# Patient Record
Sex: FEMALE | Race: WHITE | NOT HISPANIC OR LATINO | Employment: FULL TIME | URBAN - NONMETROPOLITAN AREA
[De-identification: names, ages, dates, MRNs, and addresses within clinical notes are randomized per-mention and may not be internally consistent; named-entity substitution may affect disease eponyms.]

---

## 2020-06-27 ENCOUNTER — TELEPHONE (OUTPATIENT)
Dept: URGENT CARE | Facility: CLINIC | Age: 50
End: 2020-06-27

## 2020-06-27 NOTE — TELEPHONE ENCOUNTER
----- Message from TIAGO Shah sent at 6/27/2020  9:15 AM EDT -----  Please call patient.  Covid-19 was not detected on your nasopharyngeal swab.  That is good news.  You may end your self quarantine as long as the following are satisfied:  At least 72 hours have passed since recovery defined as resolution of fever without fever reducing medications (Tylenol and /or Tylenol) AND improvement in respiratory symptoms (e.g. cough, shortness of air,), SOA, diarrhea, sore throat, muscle aches , headache and chills  AND  at least 10 days have passed since symptoms have first appeared.   Please do a virtual or E-visit follow-up with your primary care within 1 week.  Remember social distancing of 6 feet and and careful washing of hands.  Must wear cloth or surgical mask for 14 days or until symptoms resolve -whichever is longer.

## 2020-12-23 ENCOUNTER — IMMUNIZATION (OUTPATIENT)
Dept: VACCINE CLINIC | Facility: HOSPITAL | Age: 50
End: 2020-12-23

## 2020-12-23 PROCEDURE — 91301 HC SARSCO02 VAC 100MCG/0.5ML IM: CPT | Performed by: INTERNAL MEDICINE

## 2020-12-23 PROCEDURE — 0011A: CPT | Performed by: INTERNAL MEDICINE

## 2021-01-20 ENCOUNTER — IMMUNIZATION (OUTPATIENT)
Dept: VACCINE CLINIC | Facility: HOSPITAL | Age: 51
End: 2021-01-20

## 2021-01-20 PROCEDURE — 0012A: CPT | Performed by: INTERNAL MEDICINE

## 2021-01-20 PROCEDURE — 91301 HC SARSCO02 VAC 100MCG/0.5ML IM: CPT | Performed by: INTERNAL MEDICINE

## 2021-05-28 ENCOUNTER — TELEPHONE (OUTPATIENT)
Dept: SURGERY | Facility: CLINIC | Age: 51
End: 2021-05-28

## 2021-05-28 ENCOUNTER — OFFICE VISIT (OUTPATIENT)
Dept: INTERNAL MEDICINE | Facility: CLINIC | Age: 51
End: 2021-05-28

## 2021-05-28 VITALS
BODY MASS INDEX: 26.54 KG/M2 | DIASTOLIC BLOOD PRESSURE: 73 MMHG | HEIGHT: 63 IN | SYSTOLIC BLOOD PRESSURE: 128 MMHG | HEART RATE: 81 BPM | OXYGEN SATURATION: 100 % | WEIGHT: 149.8 LBS | TEMPERATURE: 97.7 F

## 2021-05-28 DIAGNOSIS — Z13.228 SCREENING FOR ENDOCRINE, NUTRITIONAL, METABOLIC AND IMMUNITY DISORDER: ICD-10-CM

## 2021-05-28 DIAGNOSIS — Z13.21 SCREENING FOR ENDOCRINE, NUTRITIONAL, METABOLIC AND IMMUNITY DISORDER: ICD-10-CM

## 2021-05-28 DIAGNOSIS — Z13.0 SCREENING FOR ENDOCRINE, NUTRITIONAL, METABOLIC AND IMMUNITY DISORDER: ICD-10-CM

## 2021-05-28 DIAGNOSIS — Z13.29 SCREENING FOR ENDOCRINE, NUTRITIONAL, METABOLIC AND IMMUNITY DISORDER: ICD-10-CM

## 2021-05-28 DIAGNOSIS — Z00.00 PHYSICAL EXAM, ANNUAL: Primary | ICD-10-CM

## 2021-05-28 DIAGNOSIS — R23.2 HOT FLASHES: ICD-10-CM

## 2021-05-28 DIAGNOSIS — Z12.11 SCREEN FOR COLON CANCER: ICD-10-CM

## 2021-05-28 DIAGNOSIS — R00.2 HEART PALPITATIONS: ICD-10-CM

## 2021-05-28 DIAGNOSIS — Z12.31 ENCOUNTER FOR SCREENING MAMMOGRAM FOR MALIGNANT NEOPLASM OF BREAST: ICD-10-CM

## 2021-05-28 PROCEDURE — 99396 PREV VISIT EST AGE 40-64: CPT | Performed by: NURSE PRACTITIONER

## 2021-05-28 RX ORDER — LANOLIN ALCOHOL/MO/W.PET/CERES
1000 CREAM (GRAM) TOPICAL DAILY
COMMUNITY

## 2021-05-28 RX ORDER — MELATONIN
1000 DAILY
COMMUNITY

## 2021-05-28 RX ORDER — MULTIVIT-MIN/IRON/FOLIC ACID/K 18-600-40
500 CAPSULE ORAL DAILY
COMMUNITY

## 2021-05-29 LAB
25(OH)D3+25(OH)D2 SERPL-MCNC: 57.1 NG/ML (ref 30–100)
ALBUMIN SERPL-MCNC: 4.5 G/DL (ref 3.5–5.2)
ALBUMIN/GLOB SERPL: 2.1 G/DL
ALP SERPL-CCNC: 84 U/L (ref 39–117)
ALT SERPL-CCNC: 14 U/L (ref 1–33)
AST SERPL-CCNC: 16 U/L (ref 1–32)
BASOPHILS # BLD AUTO: 0.03 10*3/MM3 (ref 0–0.2)
BASOPHILS NFR BLD AUTO: 0.6 % (ref 0–1.5)
BILIRUB SERPL-MCNC: 0.4 MG/DL (ref 0–1.2)
BUN SERPL-MCNC: 11 MG/DL (ref 6–20)
BUN/CREAT SERPL: 13.6 (ref 7–25)
CALCIUM SERPL-MCNC: 9.6 MG/DL (ref 8.6–10.5)
CHLORIDE SERPL-SCNC: 107 MMOL/L (ref 98–107)
CHOLEST SERPL-MCNC: 217 MG/DL (ref 0–200)
CO2 SERPL-SCNC: 26.6 MMOL/L (ref 22–29)
CREAT SERPL-MCNC: 0.81 MG/DL (ref 0.57–1)
EOSINOPHIL # BLD AUTO: 0.17 10*3/MM3 (ref 0–0.4)
EOSINOPHIL NFR BLD AUTO: 3.5 % (ref 0.3–6.2)
ERYTHROCYTE [DISTWIDTH] IN BLOOD BY AUTOMATED COUNT: 13.2 % (ref 12.3–15.4)
ESTRADIOL SERPL-MCNC: 51.1 PG/ML
FSH SERPL-ACNC: 73.8 MIU/ML
GLOBULIN SER CALC-MCNC: 2.1 GM/DL
GLUCOSE SERPL-MCNC: 92 MG/DL (ref 65–99)
HBA1C MFR BLD: 5.1 % (ref 4.8–5.6)
HCT VFR BLD AUTO: 38.8 % (ref 34–46.6)
HDLC SERPL-MCNC: 72 MG/DL (ref 40–60)
HGB BLD-MCNC: 12.8 G/DL (ref 12–15.9)
IMM GRANULOCYTES # BLD AUTO: 0.01 10*3/MM3 (ref 0–0.05)
IMM GRANULOCYTES NFR BLD AUTO: 0.2 % (ref 0–0.5)
LDLC SERPL CALC-MCNC: 132 MG/DL (ref 0–100)
LH SERPL-ACNC: 51.4 MIU/ML
LYMPHOCYTES # BLD AUTO: 1.47 10*3/MM3 (ref 0.7–3.1)
LYMPHOCYTES NFR BLD AUTO: 30.4 % (ref 19.6–45.3)
MCH RBC QN AUTO: 29.8 PG (ref 26.6–33)
MCHC RBC AUTO-ENTMCNC: 33 G/DL (ref 31.5–35.7)
MCV RBC AUTO: 90.2 FL (ref 79–97)
MONOCYTES # BLD AUTO: 0.42 10*3/MM3 (ref 0.1–0.9)
MONOCYTES NFR BLD AUTO: 8.7 % (ref 5–12)
NEUTROPHILS # BLD AUTO: 2.73 10*3/MM3 (ref 1.7–7)
NEUTROPHILS NFR BLD AUTO: 56.6 % (ref 42.7–76)
NRBC BLD AUTO-RTO: 0 /100 WBC (ref 0–0.2)
PLATELET # BLD AUTO: 236 10*3/MM3 (ref 140–450)
POTASSIUM SERPL-SCNC: 4.8 MMOL/L (ref 3.5–5.2)
PROLACTIN SERPL-MCNC: 10.1 NG/ML (ref 4.8–23.3)
PROT SERPL-MCNC: 6.6 G/DL (ref 6–8.5)
RBC # BLD AUTO: 4.3 10*6/MM3 (ref 3.77–5.28)
SODIUM SERPL-SCNC: 144 MMOL/L (ref 136–145)
T4 FREE SERPL-MCNC: 1 NG/DL (ref 0.93–1.7)
TRIGL SERPL-MCNC: 76 MG/DL (ref 0–150)
TSH SERPL DL<=0.005 MIU/L-ACNC: 4.1 UIU/ML (ref 0.27–4.2)
VIT B12 SERPL-MCNC: 1481 PG/ML (ref 211–946)
VLDLC SERPL CALC-MCNC: 13 MG/DL (ref 5–40)
WBC # BLD AUTO: 4.83 10*3/MM3 (ref 3.4–10.8)

## 2021-06-02 NOTE — TELEPHONE ENCOUNTER
PRESCREENING FOR OPEN ACCESS SCHEDULING    Shellie Palacio, 1970  [id number]    06/02/21    If, the patient answers yes to any of the following questions the provider will be informed prior to scheduling open access for approval and documented in the chart.    []  Yes  [x] No    1. Have you ever had a colonoscopy in the past?      When:        Where:       Polyps or other:     []  Yes  [x] No    2. Family history of colon cancer?      Relation:       Age of onset:       Do you currently have any of the following?    []  Yes  [x] No  Rectal bleeding, if so, how long?     []  Yes  [x] No  Abdominal pain, if so, how long?    []  Yes  [x] No  Constipation, if so, how long?    []  Yes  [x] No  Diarrhea, if so, how long?    []  Yes  [x] No  Weight loss, is so, how much?    [] Yes  [x] No  Small caliber stool, if so, how long?      Have you ever had any of the following conditions?    [] Yes  [x] No  Heart attack?      When?       Last cardiac workup?     Blood thinners?    [] Yes  [x] No   Lung problems, asthma or COPD?  [] Yes  [x] No  Oxygen required?       [] Yes  [x] No  Stroke?     [] Yes  [x] No  Have you ever had a reaction to anesthesia?      Patient is scheduled for a colonoscopy on 09/03/21

## 2021-08-10 ENCOUNTER — HOSPITAL ENCOUNTER (OUTPATIENT)
Dept: MAMMOGRAPHY | Facility: HOSPITAL | Age: 51
Discharge: HOME | End: 2021-08-10
Admitting: NURSE PRACTITIONER

## 2021-08-10 PROCEDURE — 77063 BREAST TOMOSYNTHESIS BI: CPT

## 2021-08-10 PROCEDURE — 77067 SCR MAMMO BI INCL CAD: CPT

## 2021-08-24 ENCOUNTER — TELEPHONE (OUTPATIENT)
Dept: SURGERY | Facility: CLINIC | Age: 51
End: 2021-08-24

## 2021-09-15 ENCOUNTER — OFFICE VISIT (OUTPATIENT)
Dept: CARDIOLOGY | Facility: CLINIC | Age: 51
End: 2021-09-15

## 2021-09-15 VITALS
BODY MASS INDEX: 28.24 KG/M2 | HEIGHT: 63 IN | DIASTOLIC BLOOD PRESSURE: 70 MMHG | OXYGEN SATURATION: 96 % | SYSTOLIC BLOOD PRESSURE: 116 MMHG | HEART RATE: 68 BPM | WEIGHT: 159.4 LBS

## 2021-09-15 DIAGNOSIS — R00.2 PALPITATIONS: Primary | ICD-10-CM

## 2021-09-15 DIAGNOSIS — R55 VASODEPRESSOR SYNCOPE: ICD-10-CM

## 2021-09-15 DIAGNOSIS — E78.2 MIXED HYPERLIPIDEMIA: ICD-10-CM

## 2021-09-15 PROCEDURE — 93000 ELECTROCARDIOGRAM COMPLETE: CPT | Performed by: INTERNAL MEDICINE

## 2021-09-15 PROCEDURE — 99244 OFF/OP CNSLTJ NEW/EST MOD 40: CPT | Performed by: INTERNAL MEDICINE

## 2021-09-15 NOTE — PROGRESS NOTES
[facility] Cardiology  Consultation H&P  Shellie Palacio  1970  [address]  [facility] [id number]     VISIT DATE:  09/15/21    PCP: Leatha Richards APRN  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 Palpitations  2. Hyperlipidemia  1. 7/16 197/72/47/128  2. 5/21 217/76/72/132  3. History of DVT  4. Vasodepressive syncope  5. G3, P3  6. Surgical  1. Tonsillectomy    CC:  Chief Complaint   Patient presents with   • Palpitations       Allergies  No Known Allergies    Current Medications    Current Outpatient Medications:   •  Ascorbic Acid (Vitamin C) 500 MG capsule, Take 500 mg by mouth Daily., Disp: , Rfl:   •  cholecalciferol (VITAMIN D3) 25 MCG (1000 UT) tablet, Take 1,000 Units by mouth Daily., Disp: , Rfl:   •  Misc Natural Products (OSTEO BI-FLEX JOINT SHIELD PO), Take 1 tablet by mouth Daily., Disp: , Rfl:   •  Multiple Vitamins-Minerals (MULTIVITAMIN WITH MINERALS) tablet tablet, Take 1 tablet by mouth Daily., Disp: , Rfl:   •  Probiotic Product (PROBIOTIC-10 PO), Take 1 tablet by mouth As Needed., Disp: , Rfl:   •  tretinoin (RETIN-A) 0.025 % cream, Apply thinly to face at night as tolerated. Wash off in the morning, Then moisturize, Disp: 20 g, Rfl: 2  •  triamcinolone (KENALOG) 0.1 % cream, Apply thinly to affected areas on trunk and extremities twice daily for up to 14 days per month do not use on face, groin, or axilla, Disp: 454 g, Rfl: 0  •  vitamin B-12 (CYANOCOBALAMIN) 1000 MCG tablet, Take 1,000 mcg by mouth Daily., Disp: , Rfl:   •  Zinc 50 MG capsule, Take 1 capsule by mouth Daily., Disp: , Rfl:      History of Present Illness   HPI  Shellie Palacio is a 50 y.o. year old female with the above mentioned PMH who presents for consult from TIAGO Aguirre for evaluation of palpitations dyslipidemia and syncope  Patient noted while lying on her left side on a few occasions she  would have tachypalpitations.  This was always positional and not associated with any activity.  She noted no alleviating or aggravating factors otherwise other than position  She also notes historical dyslipidemia her mother was hyperlipidemic as does her sister.  She noted one episode of vasodepressive syncope while attending the patient in her room she had a hot diaphoretic spell and then grabiel syncope.  She was on a diet plan at that time had given blood temporally related to the episode as well    Pt denies any chest pain, dyspnea at rest, dyspnea on exertion, orthopnea, PND,  lower extremity edema, or claudication. Pt denies history of CHF, DVT, PE, MI, CVA, TIA, or rheumatic fever.       ROS  Review of Systems   Constitutional: Negative for chills, fever, malaise/fatigue, night sweats, weight gain and weight loss.   HENT: Negative for hearing loss and nosebleeds.    Eyes: Negative for blurred vision, vision loss in left eye, vision loss in right eye, visual disturbance and visual halos.   Cardiovascular: Positive for palpitations. Negative for chest pain, claudication, cyanosis, dyspnea on exertion, irregular heartbeat, leg swelling, near-syncope, orthopnea, paroxysmal nocturnal dyspnea and syncope.   Respiratory: Negative for cough, hemoptysis, shortness of breath, snoring and wheezing.    Endocrine: Negative for cold intolerance, heat intolerance, polydipsia, polyphagia and polyuria.   Hematologic/Lymphatic: Negative for adenopathy and bleeding problem. Does not bruise/bleed easily.   Skin: Negative for dry skin, poor wound healing and rash.   Musculoskeletal: Negative for falls, joint pain, joint swelling, muscle cramps, muscle weakness, myalgias and neck pain.   Gastrointestinal: Negative for bloating, abdominal pain, change in bowel habit, bowel incontinence, constipation, diarrhea, dysphagia, excessive appetite, heartburn, hematemesis, hematochezia, jaundice, melena, nausea and vomiting.  "  Genitourinary: Negative for bladder incontinence, dysuria, flank pain, hematuria, hesitancy and nocturia.   Neurological: Negative for aphonia, excessive daytime sleepiness, dizziness, focal weakness, headaches, light-headedness, loss of balance, seizures, sensory change, tremors, vertigo and weakness.   Psychiatric/Behavioral: Negative for altered mental status, depression, memory loss, substance abuse and suicidal ideas. The patient is not nervous/anxious.    All other systems reviewed and are negative.      SOCIAL HX  Social History     Socioeconomic History   • Marital status:      Spouse name: Not on file   • Number of children: Not on file   • Years of education: Not on file   • Highest education level: Not on file   Tobacco Use   • Smoking status: Never Smoker   • Smokeless tobacco: Never Used   Substance and Sexual Activity   • Alcohol use: Yes     Alcohol/week: 1.0 standard drinks     Types: 1 Glasses of wine per week     Comment: occas   • Drug use: No   • Sexual activity: Defer       FAMILY HX  Family History   Problem Relation Age of Onset   • Hyperlipidemia Mother    • Dementia Mother    • Breast cancer Mother    • Cancer Maternal Grandmother    • Hearing loss Maternal Grandmother    • Heart disease Maternal Grandfather    • Asthma Father    • COPD Father    • No Known Problems Sister        Vitals:    09/15/21 1452   BP: 116/70   BP Location: Right arm   Patient Position: Sitting   Pulse: 68   SpO2: 96%   Weight: 72.3 kg (159 lb 6.4 oz)   Height: 160 cm (63\")     Body mass index is 28.24 kg/m².     PHYSICAL EXAMINATION:  Constitutional:       Appearance: Healthy appearance. Not in distress.   Neck:      Vascular: No JVR. JVD normal.   Pulmonary:      Effort: Pulmonary effort is normal.      Breath sounds: Normal breath sounds. No wheezing. No rhonchi. No rales.   Chest:      Chest wall: Not tender to palpatation.   Cardiovascular:      PMI at left midclavicular line. Normal rate. Regular " rhythm. Normal S1. Normal S2.      Murmurs: There is no murmur.      No gallop. No click. No rub.   Pulses:     Intact distal pulses.   Edema:     Peripheral edema absent.   Abdominal:      General: Bowel sounds are normal.      Palpations: Abdomen is soft.      Tenderness: There is no abdominal tenderness.   Musculoskeletal: Normal range of motion.         General: No tenderness. Skin:     General: Skin is warm and dry.   Neurological:      General: No focal deficit present.      Mental Status: Alert and oriented to person, place and time.         Diagnostic Data:    ECG 12 Lead    Date/Time: 9/15/2021 3:27 PM  Performed by: Rito Petty MD  Authorized by: Rito Petty MD   Previous ECG: no previous ECG available  Rhythm: sinus rhythm  BPM: 65    Clinical impression: non-specific ECG             Lab Results   Component Value Date    CHLPL 217 (H) 05/28/2021    TRIG 76 05/28/2021    HDL 72 (H) 05/28/2021     Lab Results   Component Value Date    GLUCOSE 92 07/29/2016    BUN 11 05/28/2021    CREATININE 0.81 05/28/2021     05/28/2021    K 4.8 05/28/2021     05/28/2021    CO2 26.6 05/28/2021     Lab Results   Component Value Date    HGBA1C 5.10 05/28/2021     Lab Results   Component Value Date    WBC 4.83 05/28/2021    HGB 12.8 05/28/2021    HCT 38.8 05/28/2021     05/28/2021       ASSESSMENT:   Diagnosis Plan   1. Palpitations     2. Mixed hyperlipidemia     3. Vasodepressor syncope         PLAN:  Palpitations normal EKG exam 1 week E patch    Dyslipidemia cardiac calcium scoring    Vasodepressive syncope discussed pathophysiology with her in the office today        TIAGO Aguirre, thank you for referring Ms. Palacio for evaluation.  I have forwarded my electronically generated recommendations to you for review.  Please do not hesitate to call with any questions.      Rito Petty MD, [facility]

## 2021-09-30 ENCOUNTER — PREP FOR SURGERY (OUTPATIENT)
Dept: OTHER | Facility: HOSPITAL | Age: 51
End: 2021-09-30

## 2021-09-30 DIAGNOSIS — Z12.11 COLON CANCER SCREENING: Primary | ICD-10-CM

## 2021-10-01 RX ORDER — POLYETHYLENE GLYCOL 3350 17 G/17G
POWDER, FOR SOLUTION ORAL
Qty: 238 G | Refills: 0 | Status: SHIPPED | OUTPATIENT
Start: 2021-10-01 | End: 2022-03-31

## 2021-10-01 RX ORDER — BISACODYL 5 MG
TABLET, DELAYED RELEASE (ENTERIC COATED) ORAL
Qty: 4 TABLET | Refills: 0 | Status: SHIPPED | OUTPATIENT
Start: 2021-10-01 | End: 2022-03-31

## 2021-10-02 ENCOUNTER — HOSPITAL ENCOUNTER (OUTPATIENT)
Dept: CT IMAGING | Facility: HOSPITAL | Age: 51
Discharge: HOME | End: 2021-10-02
Admitting: INTERNAL MEDICINE

## 2021-10-02 DIAGNOSIS — E78.2 MIXED HYPERLIPIDEMIA: ICD-10-CM

## 2021-10-02 PROCEDURE — 75571 CT HRT W/O DYE W/CA TEST: CPT

## 2021-10-04 ENCOUNTER — TELEPHONE (OUTPATIENT)
Dept: CARDIOLOGY | Facility: CLINIC | Age: 51
End: 2021-10-04

## 2021-10-04 DIAGNOSIS — I47.29 VENTRICULAR TACHYCARDIA (PAROXYSMAL) (HCC): ICD-10-CM

## 2021-10-04 DIAGNOSIS — R42 DIZZINESS: ICD-10-CM

## 2021-10-04 DIAGNOSIS — R55 VASODEPRESSOR SYNCOPE: ICD-10-CM

## 2021-10-04 DIAGNOSIS — R00.2 PALPITATIONS: Primary | ICD-10-CM

## 2021-10-04 DIAGNOSIS — R94.31 ABNORMAL HOLTER MONITOR FINDING: ICD-10-CM

## 2021-10-04 NOTE — TELEPHONE ENCOUNTER
Spoke with patient and advised that most recent CT calcium score was zero and within normal limits. RN also informed pt that a arrhythmia was detected on holter exam per  she needs a echo. Pt verbalized understanding

## 2021-10-06 ENCOUNTER — TELEPHONE (OUTPATIENT)
Dept: CARDIOLOGY | Facility: CLINIC | Age: 51
End: 2021-10-06

## 2021-10-06 NOTE — TELEPHONE ENCOUNTER
Pt is scheduled for colonoscopy on 10/08/21 and her echo is scheduled on 10/11/21. Please advise if patient is safe to proceed with GI procedure.

## 2021-10-08 ENCOUNTER — OUTSIDE FACILITY SERVICE (OUTPATIENT)
Dept: SURGERY | Facility: CLINIC | Age: 51
End: 2021-10-08

## 2021-10-08 PROCEDURE — 45385 COLONOSCOPY W/LESION REMOVAL: CPT | Performed by: SURGERY

## 2021-10-11 ENCOUNTER — APPOINTMENT (OUTPATIENT)
Dept: CARDIOLOGY | Facility: HOSPITAL | Age: 51
End: 2021-10-11

## 2021-10-20 ENCOUNTER — APPOINTMENT (OUTPATIENT)
Dept: CARDIOLOGY | Facility: HOSPITAL | Age: 51
End: 2021-10-20

## 2021-12-03 ENCOUNTER — IMMUNIZATION (OUTPATIENT)
Dept: VACCINE CLINIC | Facility: HOSPITAL | Age: 51
End: 2021-12-03

## 2021-12-03 DIAGNOSIS — Z23 NEED FOR VACCINATION: Primary | ICD-10-CM

## 2021-12-03 PROCEDURE — 0064A HC ADM SARSCOV2 50MCG/0.25ML BOOSTER: CPT | Performed by: INTERNAL MEDICINE

## 2021-12-03 PROCEDURE — 91306 HC SARSCOV2 VAC 50MCG/0.25ML IM: CPT | Performed by: INTERNAL MEDICINE

## 2022-01-17 ENCOUNTER — TRANSCRIBE ORDERS (OUTPATIENT)
Dept: PHYSICAL THERAPY | Facility: CLINIC | Age: 52
End: 2022-01-17

## 2022-01-17 DIAGNOSIS — S39.012A STRAIN OF LUMBAR REGION, INITIAL ENCOUNTER: Primary | ICD-10-CM

## 2022-01-25 ENCOUNTER — TREATMENT (OUTPATIENT)
Dept: PHYSICAL THERAPY | Facility: CLINIC | Age: 52
End: 2022-01-25

## 2022-01-25 DIAGNOSIS — S39.012S STRAIN OF LUMBAR REGION, SEQUELA: Primary | ICD-10-CM

## 2022-01-25 PROCEDURE — 97140 MANUAL THERAPY 1/> REGIONS: CPT | Performed by: PHYSICAL THERAPIST

## 2022-01-25 PROCEDURE — 97161 PT EVAL LOW COMPLEX 20 MIN: CPT | Performed by: PHYSICAL THERAPIST

## 2022-01-25 PROCEDURE — 97110 THERAPEUTIC EXERCISES: CPT | Performed by: PHYSICAL THERAPIST

## 2022-01-25 PROCEDURE — 97530 THERAPEUTIC ACTIVITIES: CPT | Performed by: PHYSICAL THERAPIST

## 2022-01-25 NOTE — PROGRESS NOTES
Physical Therapy Initial Evaluation and Plan of Care      Patient: Shellie Palacio   : 1970  Diagnosis/ICD-10 Code:  Strain of lumbar region, sequela [S39.012S]  Referring practitioner: Michelle Laura*    Subjective Evaluation    History of Present Illness  Mechanism of injury: Patient reports that she hurt her back on Dec 17th while at work. She states that she was helping a patient to the EOB and felt a burning in her low back. She states that she did not report anything because the pain went away pretty quickly. She states that a couple weeks later the pain returned in the back and her L leg was giving out. She states that she went to the doctor on  to be checked out. She states she was given two injections (pain and steroid). She states that she has not had any issues with the leg since then and states that she only has mild burning in the low back at times now. She states that she is working on light duty with no lifting/pushing/pulling over 10lbs.           Patient Occupation: OT at  [employer]  Current pain ratin  At best pain ratin  At worst pain rating: 3  Quality: burning  Relieving factors: heat, rest, relaxation and change in position  Aggravating factors: lifting, repetitive movement, stairs, movement and prolonged positioning  Progression: improved    Social Support  Lives with: spouse    Patient Goals  Patient goals for therapy: decreased pain, increased motion, increased strength, independence with ADLs/IADLs and return to work             Objective        Special Questions      Additional Special Questions  No red flags noted.       Postural Observations  Seated posture: fair  Standing posture: good        Palpation   Left   Tenderness of the erector spinae and lumbar paraspinals.     Right Tenderness of the erector spinae and lumbar paraspinals.     Neurological Testing     Sensation     Lumbar   Left   Intact: light touch    Right   Intact: light touch    Reflexes    Left   Patellar (L4): normal (2+)  Achilles (S1): normal (2+)    Right   Patellar (L4): normal (2+)  Achilles (S1): normal (2+)    Active Range of Motion     Lumbar   Normal active range of motion    Additional Active Range of Motion Details  Lumbar flexion: fingertips to medial malleolus   Lumbar extension: ~15 deg    Strength/Myotome Testing     Left Hip   Planes of Motion   Flexion: 4  Extension: 4-  Abduction: 4-  Adduction: 4+    Right Hip   Planes of Motion   Flexion: 4  Extension: 4-  Abduction: 4-  Adduction: 4+    Left Knee   Flexion: 4+  Extension: 4+    Right Knee   Flexion: 4+  Extension: 4+    Left Ankle/Foot   Dorsiflexion: 5  Plantar flexion: 5    Right Ankle/Foot   Dorsiflexion: 5  Plantar flexion: 5    Additional Strength Details  Patient demonstrates weakness in core.    Tests       Thoracic   Negative slump.     Lumbar     Left   Negative crossed SLR and passive SLR.     Right   Negative crossed SLR and passive SLR.      General Comments     Lumbar Comments  Patient ambulating well with no antalgic gait pattern noted.     She is able to manage positional changes well with no grimacing noted.     Patient was given HEP to assist return to prior functional status. All patient questions answered prior to completion of session.     No neuro symptoms noted during exam.     Tightness noted in B hamstrings with L being more significant.          Assessment & Plan     Assessment  Impairments: abnormal muscle tone, abnormal or restricted ROM, activity intolerance, impaired physical strength, lacks appropriate home exercise program and pain with function  Functional Limitations: carrying objects, lifting, pulling, pushing, uncomfortable because of pain, sitting and unable to perform repetitive tasks  Assessment details: Patient is a 51 year old female who comes to physical therapy with low back pain following a lifting injury at work. Signs and symptoms are consistent with strain of low back. The patient  currently has pain, decreased strength, and inability to perform many essential functional activities. No neuro signs/symptoms noted during exam. Patient was given HEP to assist with above listed deficits. Pt with no irritation noted at completion of session. Pt will benefit from skilled PT services to address the above issues.     Prognosis: good    Goals  Plan Goals: SHORT TERM GOALS:     2 weeks  1. Pt independent with HEP  2. Pt to report no increased pain in low back with 30 minutes of continuous activity in clinic.   3. Pt to demonstrate bilateral hip strength 4/5 in all planes to improved stability of the core/trunk     LONG TERM GOALS:   6 weeks  1. Pt to demonstrate ability to perform 30# box lift to assist with patient return to full duties at work.   2. Pt to demonstrate ability to perform full functional squat with good form and without increased pain in the low back   3. Pt to report being able to work full shift or work in the home without increase in pain in the back          Plan  Therapy options: will be seen for skilled therapy services  Planned modality interventions: cryotherapy, thermotherapy (hydrocollator packs), ultrasound and electrical stimulation/Russian stimulation  Planned therapy interventions: abdominal trunk stabilization, balance/weight-bearing training, body mechanics training, flexibility, functional ROM exercises, home exercise program, joint mobilization, therapeutic activities, stretching, strengthening, soft tissue mobilization, manual therapy and postural training  Frequency: 2x week  Duration in weeks: 6  Treatment plan discussed with: patient        Manual Therapy:    10     mins  48097;  Therapeutic Exercise:    12     mins  83546;     Neuromuscular Daria:        mins  30204;    Therapeutic Activity:     13     mins  25046;     Gait Training:           mins  63033;     Ultrasound:          mins  17191;    Electrical Stimulation:         mins  15900 ( );  Dry  Needling          mins self-pay    Timed Treatment:   35   mins   Total Treatment:     58   mins    PT SIGNATURE: Michelle Rankin, YADIRA   [place] License: [id number]  DATE TREATMENT INITIATED: 1/25/2022    Initial Certification  Certification Period: 4/24/2022  I certify that the therapy services are furnished while this patient is under my care.  The services outlined above are required by this patient, and will be reviewed every 90 days.     PHYSICIAN: Michelle Laura, APRN      DATE:     Please sign and return via fax to [phone].. Thank you, [facility] Therapy.

## 2022-01-27 ENCOUNTER — TREATMENT (OUTPATIENT)
Dept: PHYSICAL THERAPY | Facility: CLINIC | Age: 52
End: 2022-01-27

## 2022-01-27 DIAGNOSIS — S39.012S STRAIN OF LUMBAR REGION, SEQUELA: Primary | ICD-10-CM

## 2022-01-27 PROCEDURE — 97530 THERAPEUTIC ACTIVITIES: CPT | Performed by: PHYSICAL THERAPIST

## 2022-01-27 PROCEDURE — 97110 THERAPEUTIC EXERCISES: CPT | Performed by: PHYSICAL THERAPIST

## 2022-01-27 NOTE — PROGRESS NOTES
Physical Therapy Daily Progress Note    Patient Information  Shellie Palacio  1970      Visit # : 2    Shellie Palacio reports 0/10 pain today at rest.  Patient reports that her back feels good this morning. She states that the exercises have been going well at home. She states that she has noticed the hamstring stretch seems to really be helping. Patient returns to [facility] on 1/31/22.        Objective Pt presents to PT today with no distress noted.     Patient with no tenderness to palpation in low back today.     Patient ambulating and performing positional changes well.     Patient was progressed today.     Patient with very mild irritation with dead lift exercise.      See Exercise, Manual, and Modality Logs for complete treatment.     Assessment/Plan  Patient tolerated session well with no increases in pain with exercises. Patient was progressed today. She had mild irritation with dead lift exercise. Patient with no reports of increased pain following exercise. Patient encouraged to continue HEP and utilize ice as needed at home. Patient with no tenderness to palpation in low back.       Progress per Plan of Care  PT will continue to monitor patients signs and symptoms and progress patient as tolerated.     Visit Diagnoses:    ICD-10-CM ICD-9-CM   1. Strain of lumbar region, sequela  S39.012S 905.7            Manual Therapy:         mins  44837;  Therapeutic Exercise:    12     mins  66016;     Neuromuscular Daria:        mins  04322;    Therapeutic Activity:     24     mins  71438;     Gait Training:           mins  87118;     Ultrasound:          mins  50495;    Electrical Stimulation:         mins  91895 ( );  Dry Needling          mins self-pay    Timed Treatment:   38   mins   Total Treatment:     54   mins          Michelle Rankin PT  Physical Therapist

## 2022-02-01 ENCOUNTER — TREATMENT (OUTPATIENT)
Dept: PHYSICAL THERAPY | Facility: CLINIC | Age: 52
End: 2022-02-01

## 2022-02-01 DIAGNOSIS — S39.012S STRAIN OF LUMBAR REGION, SEQUELA: Primary | ICD-10-CM

## 2022-02-01 PROCEDURE — 97140 MANUAL THERAPY 1/> REGIONS: CPT | Performed by: PHYSICAL THERAPIST

## 2022-02-01 PROCEDURE — 97110 THERAPEUTIC EXERCISES: CPT | Performed by: PHYSICAL THERAPIST

## 2022-02-01 PROCEDURE — 97530 THERAPEUTIC ACTIVITIES: CPT | Performed by: PHYSICAL THERAPIST

## 2022-02-01 NOTE — PROGRESS NOTES
Physical Therapy Daily Progress Note    Patient Information  Shellie Palacio  1970      Visit # : 3    Shellie Palacio reports 0/10 pain today at rest.  Patient reports that she had some soreness in her low back following last session. She states that this went away the next day. She states that she has continued to work on her exercises at home.         Objective Pt presents to PT today with no distress noted.     Patient with no tenderness to palpation in low back.     Patient ambulating well with no antalgic gait present.     Patient with no pain with exercises today.     Patient with mild reports of fatigue in B LE and low back at completion of session.       See Exercise, Manual, and Modality Logs for complete treatment.     Assessment/Plan  Patient tolerated session well with no increases in pain with exercises. Patient with no tenderness to palpation in low back. Patient tolerated manual therapy well. She is demonstrating less tightness in B hamstrings. Patient did had reports of fatigue in B LE and low back at completion of session. Patient encouraged to continue HEP and utilize ice as needed at home.       Progress per Plan of Care  PT will continue to monitor patients signs and symptoms and progress patient as tolerated.     Visit Diagnoses:    ICD-10-CM ICD-9-CM   1. Strain of lumbar region, sequela  S39.012S 905.7            Manual Therapy:    10     mins  46390;  Therapeutic Exercise:    24     mins  54426;     Neuromuscular Daria:        mins  93493;    Therapeutic Activity:     11     mins  83611;     Gait Training:           mins  83538;     Ultrasound:          mins  40498;    Electrical Stimulation:         mins  53413 ( );  Dry Needling          mins self-pay    Timed Treatment:   45   mins   Total Treatment:     45   mins          Michelle Rankin PT  Physical Therapist

## 2022-02-08 ENCOUNTER — TREATMENT (OUTPATIENT)
Dept: PHYSICAL THERAPY | Facility: CLINIC | Age: 52
End: 2022-02-08

## 2022-02-08 DIAGNOSIS — S39.012S STRAIN OF LUMBAR REGION, SEQUELA: Primary | ICD-10-CM

## 2022-02-08 PROCEDURE — 97530 THERAPEUTIC ACTIVITIES: CPT | Performed by: PHYSICAL THERAPIST

## 2022-02-08 PROCEDURE — 97110 THERAPEUTIC EXERCISES: CPT | Performed by: PHYSICAL THERAPIST

## 2022-02-08 PROCEDURE — 97140 MANUAL THERAPY 1/> REGIONS: CPT | Performed by: PHYSICAL THERAPIST

## 2022-02-08 NOTE — PROGRESS NOTES
Physical Therapy Daily Progress Note    Patient Information  Shellie Palacio  1970      Visit # : 4    Shellie Palacio reports 0/10 pain today at rest.  Patient reports that she has been doing really good. She states that she has been doing some cardio and it did not hurt her back. She states that she has not been having any issues at work either.         Objective Pt presents to PT today with no distress noted.     Patient with no tenderness to palpation in low back.    Patient with no issues performing positional changes.     Patient was slightly progressed today with no issue noted.       See Exercise, Manual, and Modality Logs for complete treatment.     Assessment/Plan  Patient tolerated session well with no increases in pain. Patient had reports of fatigue in B LE and core at completion of session. Patient with no tenderness to palpation in low back. Patient tolerated manual therapy well with reports of reduced stiffness. Patient encouraged to continue HEP and utilize ice as needed at home.       Progress per Plan of Care  PT will continue to monitor patients signs and symptoms and progress patient as tolerated.    Visit Diagnoses:    ICD-10-CM ICD-9-CM   1. Strain of lumbar region, sequela  S39.012S 905.7            Manual Therapy:    10     mins  22867;  Therapeutic Exercise:    20     mins  70483;     Neuromuscular Daria:        mins  82230;    Therapeutic Activity:     12     mins  27529;     Gait Training:           mins  98556;     Ultrasound:          mins  76917;    Electrical Stimulation:         mins  07705 ( );  Dry Needling          mins self-pay    Timed Treatment:   42   mins   Total Treatment:     50   mins          Michelle Rankin, PT  Physical Therapist

## 2022-02-09 ENCOUNTER — TREATMENT (OUTPATIENT)
Dept: PHYSICAL THERAPY | Facility: CLINIC | Age: 52
End: 2022-02-09

## 2022-02-09 DIAGNOSIS — S39.012S STRAIN OF LUMBAR REGION, SEQUELA: Primary | ICD-10-CM

## 2022-02-09 PROCEDURE — 97110 THERAPEUTIC EXERCISES: CPT | Performed by: PHYSICAL THERAPIST

## 2022-02-09 PROCEDURE — 97530 THERAPEUTIC ACTIVITIES: CPT | Performed by: PHYSICAL THERAPIST

## 2022-02-09 PROCEDURE — 97140 MANUAL THERAPY 1/> REGIONS: CPT | Performed by: PHYSICAL THERAPIST

## 2022-02-10 NOTE — PROGRESS NOTES
Physical Therapy Daily Progress Note    Patient Information  Shellie Palacio  1970      Visit # : 5    Shellie Palacio reports 0/10 pain today at rest.  Patient reports that her back has been doing well. She states that if she does too much she will notice a mild discomfort in her low back. Patient states that she has been very aware of her body mechanics and working on her exercises at home.         Objective Pt presents to PT today with no distress noted.     No tenderness to palpation in low back.     Patient with no irritation of low back symptoms with exercises.     Dead lift exercise was added to exercise program today.    No issues with positional changes today.     Patient with reports of muscle soreness in B LE.      See Exercise, Manual, and Modality Logs for complete treatment.     Assessment/Plan  Patient tolerated session well with no increases in pain with exercises. Patient with no tenderness to palpation in low back. Patient tolerated manual therapy well and is demonstrating better flexibility in B hamstrings. Patient encouraged to continue HEP and to utilize ice/heat as needed at home.       Progress per Plan of Care  PT will continue to monitor patients signs and symptoms and progress patient as tolerated.     Visit Diagnoses:    ICD-10-CM ICD-9-CM   1. Strain of lumbar region, sequela  S39.012S 905.7            Manual Therapy:    11     mins  43569;  Therapeutic Exercise:    18     mins  46479;     Neuromuscular Daria:        mins  58072;    Therapeutic Activity:     13     mins  08560;     Gait Training:           mins  41600;     Ultrasound:          mins  41047;    Electrical Stimulation:         mins  16283 ( );  Dry Needling          mins self-pay    Timed Treatment:   42   mins   Total Treatment:     48   mins          Michelle Rankin, PT  Physical Therapist

## 2022-02-18 ENCOUNTER — TREATMENT (OUTPATIENT)
Dept: PHYSICAL THERAPY | Facility: CLINIC | Age: 52
End: 2022-02-18

## 2022-02-18 DIAGNOSIS — S39.012S STRAIN OF LUMBAR REGION, SEQUELA: Primary | ICD-10-CM

## 2022-02-18 PROCEDURE — 97140 MANUAL THERAPY 1/> REGIONS: CPT | Performed by: PHYSICAL THERAPIST

## 2022-02-18 PROCEDURE — 97530 THERAPEUTIC ACTIVITIES: CPT | Performed by: PHYSICAL THERAPIST

## 2022-02-18 PROCEDURE — 97110 THERAPEUTIC EXERCISES: CPT | Performed by: PHYSICAL THERAPIST

## 2022-02-18 NOTE — PROGRESS NOTES
Physical Therapy Daily Progress Note    Patient Information  Shellie Palacio  1970      Visit # : 6    Shellie Palacio reports 2/10 pain today at rest.  Patient reports that she was released on Monday to return to full duty. She states that she joined the gym on Monday and started doing some of the exercises we do here. She states that by Wednesday her back was flared up again with a burning sensation across her low back. She states that she did not do any heavy lifting besides what she has to do at work.         Objective Pt presents to PT today with no distress noted.     Patient with very mild tenderness to palpation in low back.     Patient able to manage positional changes with no issues noted.     Patient with no issues with exercises.     Lifting exercises were deferred today.       See Exercise, Manual, and Modality Logs for complete treatment.     Assessment/Plan  Patient tolerated session well with no increases in pain with exercises. Patient with tenderness to palpation in low back. Patient tolerated manual therapy well with reports of reduced stiffness. Patient with reports of mild burning in low back throughout session but was resolved following ice. Patient was encouraged to continue HEP and utilize ice at home.       Progress per Plan of Care  PT will continue to monitor patients signs and symptoms and progress patient as tolerated.     Visit Diagnoses:    ICD-10-CM ICD-9-CM   1. Strain of lumbar region, sequela  S39.012S 905.7            Manual Therapy:    10     mins  13039;  Therapeutic Exercise:    16     mins  24620;     Neuromuscular Daria:        mins  76217;    Therapeutic Activity:     11     mins  81077;     Gait Training:           mins  04107;     Ultrasound:          mins  17512;    Electrical Stimulation:         mins  10920 ( );  Dry Needling          mins self-pay    Timed Treatment:   37   mins   Total Treatment:     54   mins          Michelle Rankin PT  Physical  Therapist

## 2022-02-21 ENCOUNTER — TREATMENT (OUTPATIENT)
Dept: PHYSICAL THERAPY | Facility: CLINIC | Age: 52
End: 2022-02-21

## 2022-02-21 DIAGNOSIS — S39.012S STRAIN OF LUMBAR REGION, SEQUELA: Primary | ICD-10-CM

## 2022-02-21 PROCEDURE — 97110 THERAPEUTIC EXERCISES: CPT | Performed by: PHYSICAL THERAPIST

## 2022-02-21 PROCEDURE — 97140 MANUAL THERAPY 1/> REGIONS: CPT | Performed by: PHYSICAL THERAPIST

## 2022-02-21 PROCEDURE — 97530 THERAPEUTIC ACTIVITIES: CPT | Performed by: PHYSICAL THERAPIST

## 2022-02-21 NOTE — PROGRESS NOTES
Physical Therapy Daily Progress Note    Patient Information  Shellie Palacio  1970      Visit # : 7    Shellie Palacio reports 1-2/10 pain today at rest.  Patient reports that she took it easier over the weekend and it helped her back. She states that today at work she started having burning in the low back again. She states that she had to do some lifting today.         Objective Pt presents to PT today with no distress noted.     Patient with reports of reduced pressure in low back with PA to L3-L5.     No irritation of symptoms with exercises.     Lifting exercises were deferred due to current symptoms.     Patient with no issues performing positional changes.      See Exercise, Manual, and Modality Logs for complete treatment.     Assessment/Plan  Patient tolerated session well with no increases in pain with exercises. Patient with no tenderness to palpation in low back. Patient tolerated manual therapy well and had reports of reduced pressure in low back. Patient encouraged to continue HEP and utilize ice as needed at home.      Progress per Plan of Care  PT will continue to monitor patients signs and symptoms and progress patient as tolerated.     Visit Diagnoses:    ICD-10-CM ICD-9-CM   1. Strain of lumbar region, sequela  S39.012S 905.7            Manual Therapy:    11     mins  23806;  Therapeutic Exercise:    17     mins  69178;     Neuromuscular Daria:        mins  92025;    Therapeutic Activity:     10     mins  40348;     Gait Training:           mins  81511;     Ultrasound:          mins  18601;    Electrical Stimulation:         mins  02877 ( );  Dry Needling          mins self-pay    Timed Treatment:   38   mins   Total Treatment:     54   mins          Michelle Rankin, PT  Physical Therapist

## 2022-02-23 ENCOUNTER — TREATMENT (OUTPATIENT)
Dept: PHYSICAL THERAPY | Facility: CLINIC | Age: 52
End: 2022-02-23

## 2022-02-23 DIAGNOSIS — S39.012S STRAIN OF LUMBAR REGION, SEQUELA: Primary | ICD-10-CM

## 2022-02-23 PROCEDURE — 97140 MANUAL THERAPY 1/> REGIONS: CPT | Performed by: PHYSICAL THERAPIST

## 2022-02-23 PROCEDURE — 97530 THERAPEUTIC ACTIVITIES: CPT | Performed by: PHYSICAL THERAPIST

## 2022-02-23 PROCEDURE — 97110 THERAPEUTIC EXERCISES: CPT | Performed by: PHYSICAL THERAPIST

## 2022-02-23 NOTE — PROGRESS NOTES
Physical Therapy Daily Progress Note    Patient Information  Shellie Palacio  1970      Visit # : 8    Shellie Palacio reports 2/10 pain today at rest.  Patient reports that she had to do a lot of lifting yesterday at work and it really flared her back up. She states that her low back pain was more wide spread pain. She states that today it is more focused in her mid low back.         Objective Pt presents to PT today with no distress noted.     Lifting exercises were not resumed today.     No issues with positional changes.     Patient with very mild tenderness to palpation in low back.     Patient had relief of burning in low back following ice at completion of session.      See Exercise, Manual, and Modality Logs for complete treatment.     Assessment/Plan  Patient tolerated session well with no increases in pain with exercises. Patient with mild tenderness to palpation in low back. Patient tolerated manual therapy well with no irritation noted. Patient encouraged to continue HEP and utilize ice as needed at home.       Progress per Plan of Care  PT will continue to monitor patients signs and symptoms and progress patient as tolerated.     Visit Diagnoses:    ICD-10-CM ICD-9-CM   1. Strain of lumbar region, sequela  S39.012S 905.7            Manual Therapy:    10     mins  78792;  Therapeutic Exercise:    18     mins  43971;     Neuromuscular Daria:        mins  33788;    Therapeutic Activity:     11     mins  04942;     Gait Training:           mins  42545;     Ultrasound:          mins  31030;    Electrical Stimulation:         mins  16814 ( );  Dry Needling          mins self-pay    Timed Treatment:   39   mins   Total Treatment:     50   mins          Michelle Rankin, PT  Physical Therapist

## 2022-02-28 ENCOUNTER — TREATMENT (OUTPATIENT)
Dept: PHYSICAL THERAPY | Facility: CLINIC | Age: 52
End: 2022-02-28

## 2022-02-28 DIAGNOSIS — S39.012S STRAIN OF LUMBAR REGION, SEQUELA: Primary | ICD-10-CM

## 2022-02-28 PROCEDURE — 97140 MANUAL THERAPY 1/> REGIONS: CPT | Performed by: PHYSICAL THERAPIST

## 2022-02-28 PROCEDURE — 97530 THERAPEUTIC ACTIVITIES: CPT | Performed by: PHYSICAL THERAPIST

## 2022-02-28 PROCEDURE — 97110 THERAPEUTIC EXERCISES: CPT | Performed by: PHYSICAL THERAPIST

## 2022-03-07 ENCOUNTER — TREATMENT (OUTPATIENT)
Dept: PHYSICAL THERAPY | Facility: CLINIC | Age: 52
End: 2022-03-07

## 2022-03-07 DIAGNOSIS — S39.012S STRAIN OF LUMBAR REGION, SEQUELA: Primary | ICD-10-CM

## 2022-03-07 PROCEDURE — 97140 MANUAL THERAPY 1/> REGIONS: CPT | Performed by: PHYSICAL THERAPIST

## 2022-03-07 PROCEDURE — 97530 THERAPEUTIC ACTIVITIES: CPT | Performed by: PHYSICAL THERAPIST

## 2022-03-07 PROCEDURE — 97110 THERAPEUTIC EXERCISES: CPT | Performed by: PHYSICAL THERAPIST

## 2022-03-07 NOTE — PROGRESS NOTES
Physical Therapy Daily Progress Note    Patient Information  Shellie Palacio  1970      Visit # : 10    Shellie Palacio reports 1/10 pain today at rest.  Patient reports that her back has been doing better. She states that she is able to do more without her back becoming irritated. She states that she has a little pain about the size of her thumb in the middle of her low back that hurts.         Objective Pt presents to PT today with no distress noted.     Patient with very mild tenderness to palpation over L5/S1 spinous process.     Patient with very mild irritation of symptoms with dead lift exercise but pain returned to baseline at completion of exercise.    Patient had reports of reduced soreness following ice at completion of session.       See Exercise, Manual, and Modality Logs for complete treatment.     Assessment/Plan  Patient tolerated session well. She had mild irritation of symptoms with dead lift exercise but this improved following exercise. Patient is reporting being able to do more at work with less pain. Patient tolerated manual therapy well and is demonstrating less tenderness in low back musculature. Patient encouraged to continue HEP and utilize ice as needed at home.      Progress per Plan of Care  PT will continue to monitor patients signs and symptoms and progress patient as tolerated.    Visit Diagnoses:    ICD-10-CM ICD-9-CM   1. Strain of lumbar region, sequela  S39.012S 905.7            Manual Therapy:    12     mins  73958;  Therapeutic Exercise:    16     mins  88340;     Neuromuscular Daria:        mins  26293;    Therapeutic Activity:     11     mins  18350;     Gait Training:           mins  61188;     Ultrasound:          mins  58461;    Electrical Stimulation:         mins  74997 ( );  Dry Needling          mins self-pay    Timed Treatment:   39   mins   Total Treatment:     50   mins          Michelle Rankin PT  Physical Therapist

## 2022-03-09 ENCOUNTER — TREATMENT (OUTPATIENT)
Dept: PHYSICAL THERAPY | Facility: CLINIC | Age: 52
End: 2022-03-09

## 2022-03-09 DIAGNOSIS — S39.012S STRAIN OF LUMBAR REGION, SEQUELA: Primary | ICD-10-CM

## 2022-03-09 PROCEDURE — 97530 THERAPEUTIC ACTIVITIES: CPT | Performed by: PHYSICAL THERAPIST

## 2022-03-09 PROCEDURE — 97110 THERAPEUTIC EXERCISES: CPT | Performed by: PHYSICAL THERAPIST

## 2022-03-09 PROCEDURE — 97140 MANUAL THERAPY 1/> REGIONS: CPT | Performed by: PHYSICAL THERAPIST

## 2022-03-09 NOTE — PROGRESS NOTES
Physical Therapy Daily Progress Note    Patient Information  Shellie Palacio  1970      Visit # : 11    Shellie Palacio reports 2/10 pain today at rest.  Patient reports that she has mild discomfort in her low back at the end of her work day but this discomfort is better when she wakes up the next morning. She states that this is improved because before it use to not go away at all. She states that she can sleep through the night without it waking her up.         Objective Pt presents to PT today with no distress noted.     No issues reported with dead lift exercise today.    Patient with no tenderness to palpation in low back.      See Exercise, Manual, and Modality Logs for complete treatment.     Assessment/Plan  Patient tolerated session well with no increases in pain with exercises. Patient with no tenderness to palpation in low back. She is ambulating well and is able to manage positional changes with no issues. Patient tolerated manual therapy well with reports of reduced symptoms. Patient encouraged to continue HEP and utilize ice as needed at home.       Progress per Plan of Care  PT will continue to monitor patients signs and symptoms and progress patient as tolerated.     Visit Diagnoses:    ICD-10-CM ICD-9-CM   1. Strain of lumbar region, sequela  S39.012S 905.7            Manual Therapy:    12     mins  75643;  Therapeutic Exercise:    15     mins  13210;     Neuromuscular Daria:        mins  69031;    Therapeutic Activity:     12     mins  67453;     Gait Training:           mins  17453;     Ultrasound:          mins  95027;    Electrical Stimulation:         mins  74745 ( );  Dry Needling          mins self-pay    Timed Treatment:   39   mins   Total Treatment:     55   mins          Michelle Rankin PT  Physical Therapist

## 2022-03-23 ENCOUNTER — TREATMENT (OUTPATIENT)
Dept: PHYSICAL THERAPY | Facility: CLINIC | Age: 52
End: 2022-03-23

## 2022-03-23 DIAGNOSIS — S39.012S STRAIN OF LUMBAR REGION, SEQUELA: Primary | ICD-10-CM

## 2022-03-23 PROCEDURE — 97110 THERAPEUTIC EXERCISES: CPT | Performed by: PHYSICAL THERAPIST

## 2022-03-23 PROCEDURE — 97140 MANUAL THERAPY 1/> REGIONS: CPT | Performed by: PHYSICAL THERAPIST

## 2022-03-23 PROCEDURE — 97530 THERAPEUTIC ACTIVITIES: CPT | Performed by: PHYSICAL THERAPIST

## 2022-03-23 NOTE — PROGRESS NOTES
Physical Therapy Daily Progress Note    Patient Information  Shellie Palacio  1970      Visit # : 12    Shellie Palacio reports 0/10 pain today at rest.  Patient reports that as long as she does not lift too much her back does okay. She reports the soreness does not last very long. She feels like overall her back is doing better.         Objective Pt presents to PT today with no distress noted.     No tenderness to palpation in low back today.     No irritation of symptoms with exercises.     Patient with no hypertonicity noted in lumbar musculature.      See Exercise, Manual, and Modality Logs for complete treatment.     Assessment/Plan  Patient tolerated session well with no increases in pain with exercises. Patient with no fatigue noted. No tenderness observed in low back. Patient tolerated manual therapy well with no irritation of symptoms. Patient encouraged to continue HEP use ice/heat as needed. Patient advised to follow up with [facility] if symptoms return.       Other  Holding PT at this time.     Visit Diagnoses:    ICD-10-CM ICD-9-CM   1. Strain of lumbar region, sequela  S39.012S 905.7            Manual Therapy:    10     mins  20528;  Therapeutic Exercise:    16     mins  28259;     Neuromuscular Daria:        mins  54385;    Therapeutic Activity:     12     mins  80373;     Gait Training:           mins  38824;     Ultrasound:          mins  63591;    Electrical Stimulation:         mins  01603 ( );  Dry Needling          mins self-pay    Timed Treatment:   38   mins   Total Treatment:     45   mins          Michelle Rankin PT  Physical Therapist

## 2022-03-31 ENCOUNTER — OFFICE VISIT (OUTPATIENT)
Dept: INTERNAL MEDICINE | Facility: CLINIC | Age: 52
End: 2022-03-31

## 2022-03-31 VITALS
RESPIRATION RATE: 15 BRPM | OXYGEN SATURATION: 98 % | WEIGHT: 166 LBS | SYSTOLIC BLOOD PRESSURE: 114 MMHG | HEIGHT: 63 IN | HEART RATE: 77 BPM | BODY MASS INDEX: 29.41 KG/M2 | TEMPERATURE: 97.9 F | DIASTOLIC BLOOD PRESSURE: 85 MMHG

## 2022-03-31 DIAGNOSIS — Z86.718 HISTORY OF BLOOD CLOTS: ICD-10-CM

## 2022-03-31 DIAGNOSIS — I83.813 VARICOSE VEINS OF BOTH LOWER EXTREMITIES WITH PAIN: Primary | ICD-10-CM

## 2022-03-31 DIAGNOSIS — Z12.4 ENCOUNTER FOR PAPANICOLAOU SMEAR FOR CERVICAL CANCER SCREENING: ICD-10-CM

## 2022-03-31 DIAGNOSIS — I78.1 SPIDER VEIN, SYMPTOMATIC: ICD-10-CM

## 2022-03-31 PROCEDURE — 99214 OFFICE O/P EST MOD 30 MIN: CPT | Performed by: NURSE PRACTITIONER

## 2022-04-13 RX ORDER — FLUCONAZOLE 150 MG/1
150 TABLET ORAL ONCE
Qty: 1 TABLET | Refills: 0 | Status: SHIPPED | OUTPATIENT
Start: 2022-04-13 | End: 2022-04-15

## 2024-03-18 ENCOUNTER — OFFICE VISIT (OUTPATIENT)
Dept: INTERNAL MEDICINE | Facility: CLINIC | Age: 54
End: 2024-03-18
Payer: COMMERCIAL

## 2024-03-18 VITALS
HEIGHT: 63 IN | RESPIRATION RATE: 16 BRPM | DIASTOLIC BLOOD PRESSURE: 71 MMHG | SYSTOLIC BLOOD PRESSURE: 114 MMHG | WEIGHT: 157 LBS | TEMPERATURE: 98.3 F | OXYGEN SATURATION: 98 % | BODY MASS INDEX: 27.82 KG/M2 | HEART RATE: 87 BPM

## 2024-03-18 DIAGNOSIS — Z13.228 SCREENING FOR ENDOCRINE, NUTRITIONAL, METABOLIC AND IMMUNITY DISORDER: ICD-10-CM

## 2024-03-18 DIAGNOSIS — Z13.0 SCREENING FOR ENDOCRINE, NUTRITIONAL, METABOLIC AND IMMUNITY DISORDER: ICD-10-CM

## 2024-03-18 DIAGNOSIS — M25.511 RIGHT SHOULDER PAIN, UNSPECIFIED CHRONICITY: ICD-10-CM

## 2024-03-18 DIAGNOSIS — Z00.00 PHYSICAL EXAM, ANNUAL: Primary | ICD-10-CM

## 2024-03-18 DIAGNOSIS — Z12.31 ENCOUNTER FOR SCREENING MAMMOGRAM FOR MALIGNANT NEOPLASM OF BREAST: ICD-10-CM

## 2024-03-18 DIAGNOSIS — Z13.21 SCREENING FOR ENDOCRINE, NUTRITIONAL, METABOLIC AND IMMUNITY DISORDER: ICD-10-CM

## 2024-03-18 DIAGNOSIS — E55.9 VITAMIN D INSUFFICIENCY: ICD-10-CM

## 2024-03-18 DIAGNOSIS — Z78.0 MENOPAUSE: ICD-10-CM

## 2024-03-18 DIAGNOSIS — Z13.29 SCREENING FOR ENDOCRINE, NUTRITIONAL, METABOLIC AND IMMUNITY DISORDER: ICD-10-CM

## 2024-03-18 RX ORDER — CYCLOBENZAPRINE HCL 5 MG
5 TABLET ORAL NIGHTLY PRN
Qty: 20 TABLET | Refills: 1 | Status: SHIPPED | OUTPATIENT
Start: 2024-03-18

## 2024-03-18 NOTE — PROGRESS NOTES
Chief Complaint   Patient presents with    Annual Exam       Shellie Palacio is a 53 y.o. female and is here for a comprehensive physical exam.      History:  LMP: No LMP recorded (lmp unknown). Patient is postmenopausal.  Last pap date:   Abnormal pap? no  : 3  Para: 3  STD:none  Age of menarche:14  Sexually active:15  Abuse:none   (male)    Do you take any herbs or supplements that were not prescribed by a doctor? yes  Are you taking calcium supplements? no  Are you taking aspirin daily? no      Health Habits:  Dental Exam. up to date  Eye Exam. up to date  Dermatology.not up to date - advised patient to schedule or closely monitor for changes in skin lesions  Exercise: 4 times/week.  Current exercise activities include: walking    The patient's last physical examination was conducted in . She regularly performs self-breast examinations. In , she underwent a colonoscopy, which revealed two polyps: one in the sigmoid colon and another in the right colon. A follow-up colonoscopy has been recommended in 5 years. She had dense breasts in , will order that. The patient has no known exposure to hepatitis.    She reports no issues with constipation.    The patient believes she has an impingement in her shoulder. Initially, she experienced severe nerve pain during overhead reaching activities, but currently, she is only limited in her reach. She describes the sensation as extreme tightness rather than a pulling feeling. She does not believe the issue originates from her neck or back. An appointment was scheduled 3 weeks ago but was cancelled and subsequently rescheduled. The shoulder issue was previously disrupting her sleep, but it has since improved. She has not used any muscle relaxants.    The patient is right-hand dominant and does not carry heavy loads. She has a diagnosis of scoliosis and has been attempting scapular strength stretching exercises. She denies any pulling sensation  when turning her head. The shoulder injury occurred during the Christmas period. She has sought treatment from a physical therapist and a chiropractor and has received massage and cupping therapies. She reports that her shoulder feels significantly weaker and believes she has lost a considerable amount of strength.    The patient still has her gallbladder. She often feels hot during the night and uses a fan and a cooling pillow for comfort. She takes melatonin and magnesium supplements.    She has received 3 COVID-19 vaccines.    She denies any family history of lupus or rheumatoid arthritis. She has a family history of dementia.    Health Maintenance   Topic Date Due    ANNUAL PHYSICAL  05/28/2022    MAMMOGRAM  08/10/2022    COVID-19 Vaccine (4 - 2023-24 season) 03/06/2025 (Originally 9/1/2023)    HEPATITIS C SCREENING  03/18/2025 (Originally 7/29/2016)    INFLUENZA VACCINE  08/01/2024    BMI FOLLOWUP  03/18/2025    LIPID PANEL  03/25/2025    PAP SMEAR  03/31/2025    COLORECTAL CANCER SCREENING  10/08/2026    TDAP/TD VACCINES (2 - Td or Tdap) 06/01/2031    ZOSTER VACCINE  Completed    Pneumococcal Vaccine 0-64  Aged Out       PMH, PSH, SocHx, FamHx, Allergies, and Medications: Reviewed and updated in the Visit Navigator.     Allergies   Allergen Reactions    Decadron [Dexamethasone] Other (See Comments)     numbness     Past Medical History:   Diagnosis Date    Deep vein thrombosis     Hyperlipidemia 2019    Scoliosis 1984     Past Surgical History:   Procedure Laterality Date    COLONOSCOPY  10/21    TONSILLECTOMY       Social History     Socioeconomic History    Marital status:    Tobacco Use    Smoking status: Never    Smokeless tobacco: Never   Substance and Sexual Activity    Alcohol use: Not Currently     Alcohol/week: 2.0 standard drinks of alcohol     Comment: occas    Drug use: Never    Sexual activity: Yes     Partners: Male     Birth control/protection: Post-menopausal, Surgical     Family History  "  Problem Relation Age of Onset    Hyperlipidemia Mother     Dementia Mother     Breast cancer Mother     Cancer Mother     Cancer Maternal Grandmother     Hearing loss Maternal Grandmother     Heart disease Maternal Grandfather     Asthma Father     COPD Father     No Known Problems Sister        Review of Systems  Review of Systems   Musculoskeletal:         Positive for shoulder pain and arthritis.          Vitals:    03/18/24 1315   BP: 114/71   Pulse: 87   Resp: 16   Temp: 98.3 °F (36.8 °C)   SpO2: 98%       Objective   /71   Pulse 87   Temp 98.3 °F (36.8 °C) (Temporal)   Resp 16   Ht 160 cm (63\")   Wt 71.2 kg (157 lb)   LMP  (LMP Unknown)   SpO2 98%   BMI 27.81 kg/m²   BMI is >= 25 and <30. (Overweight) The following options were offered after discussion;: exercise counseling/recommendations and nutrition counseling/recommendations      Physical Exam  Vitals and nursing note reviewed.   Constitutional:       General: She is not in acute distress.     Appearance: Normal appearance. She is well-developed.   HENT:      Head: Normocephalic and atraumatic.      Comments: She has mild fluid in her bilateral ears.     Right Ear: Hearing, tympanic membrane, ear canal and external ear normal. Tympanic membrane is erythematous and bulging.      Left Ear: Hearing, tympanic membrane, ear canal and external ear normal. Tympanic membrane is erythematous and bulging.      Nose: Nose normal. Mucosal edema present. No rhinorrhea.      Right Sinus: No maxillary sinus tenderness or frontal sinus tenderness.      Left Sinus: No maxillary sinus tenderness or frontal sinus tenderness.      Mouth/Throat:      Mouth: Mucous membranes are dry.      Dentition: Normal dentition.      Pharynx: Posterior oropharyngeal erythema present.      Comments: PND    Eyes:      Conjunctiva/sclera: Conjunctivae normal.      Pupils: Pupils are equal, round, and reactive to light.   Neck:      Thyroid: No thyroid mass or thyromegaly.     "  Vascular: No carotid bruit or JVD.   Cardiovascular:      Rate and Rhythm: Normal rate and regular rhythm.      Pulses: Normal pulses.      Heart sounds: Normal heart sounds, S1 normal and S2 normal. No murmur heard.  Pulmonary:      Effort: Pulmonary effort is normal. No respiratory distress.      Breath sounds: Normal breath sounds.   Abdominal:      General: Bowel sounds are normal. There is no distension or abdominal bruit.      Palpations: Abdomen is soft. There is no mass.      Tenderness: There is no abdominal tenderness. There is no right CVA tenderness, left CVA tenderness, guarding or rebound.      Hernia: No hernia is present.   Musculoskeletal:         General: Normal range of motion.      Cervical back: Normal range of motion and neck supple.   Lymphadenopathy:      Head:      Right side of head: No submental, submandibular or tonsillar adenopathy.      Left side of head: No submental, submandibular or tonsillar adenopathy.      Cervical: No cervical adenopathy.   Skin:     General: Skin is warm and dry.      Capillary Refill: Capillary refill takes less than 2 seconds.      Findings: No rash.      Nails: There is no clubbing.   Neurological:      Mental Status: She is alert and oriented to person, place, and time.      Cranial Nerves: No cranial nerve deficit.      Sensory: No sensory deficit.      Gait: Gait normal.   Psychiatric:         Behavior: Behavior normal.         Thought Content: Thought content normal.         Judgment: Judgment normal.              Assessment & Plan   1. Healthy female exam. She is up-to-date on her Pap smear. Her last Pap smear showed some atypical cells. She is not due for a colonoscopy. I will order a mammogram. She was advised to avoid caffeine. I will check B12, thyroid, CBC, cholesterol, A1c, and CMP. She was advised to do weightbearing exercises and strengthening. She was advised to stay well hydrated. I will order a Pap smear. I will order a DEXA scan.     2.  Patient Counseling: Including but not Limited to the following, when appropriate:  --Nutrition: Stressed importance of moderation in sodium/caffeine intake, saturated fat and cholesterol, caloric balance, sufficient intake of fresh fruits, vegetables, fiber, calcium, iron, and 1 mg of folate supplement per day (for females capable of pregnancy).  --Discussed the issue of estrogen replacement, calcium supplement, and the daily use of baby aspirin.  --Exercise: Stressed the importance of regular exercise.   --Substance Abuse: Discussed cessation/primary prevention of tobacco, alcohol, or other drug use; driving or other dangerous activities under the influence; availability of treatment for abuse, as indicated based on social history.    --Sexuality: Discussed sexually transmitted diseases, partner selection, use of condoms, avoidance of unintended pregnancy  and contraceptive alternatives.   --Injury prevention: Discussed safety belts, safety helmets, smoke detector, smoking near bedding or upholstery.   --Dental health: Discussed importance of regular tooth brushing, flossing, and dental visits.  --Immunizations reviewed.  --Discussed benefits of colon cancer screening.      3. Discussed the patient's BMI with her.  The BMI is in the acceptable range  4. Return if symptoms worsen or fail to improve.  5. Age-appropriate Screening Scheduled      Assessment & Plan   1. Encounter for screening mammogram for malignant neoplasm of breast    - Mammo Screening Digital Tomosynthesis Bilateral With CAD    2. Physical exam, annual    - Comprehensive Metabolic Panel  - Lipid Panel  - CBC Auto Differential    3. Screening for endocrine, nutritional, metabolic and immunity disorder    - Hemoglobin A1c  - Vitamin B12  - TSH  - T4, Free    4. Vitamin D insufficiency    - Vitamin D,25-Hydroxy      5. Right shoulder pain  I will prescribe Flexeril. She was advised to take half a tablet at night. She was advised to do massage. She was  advised to have good posture and tighten her bra. If Flexeril does not help, she will send me a message.    6 Menopause.  Menopause can cause joint pain. She was advised to stay well hydrated.    Follow-up  The patient will follow up in 1 year.         Leatha Richards, TIAGO 03/18/2024      ATTESTATION:  The medical record documentation of this Provider’s service encounter was entered by Vivien Shelley, acting as  for Leatha Richards.

## 2024-03-25 ENCOUNTER — LAB (OUTPATIENT)
Dept: LAB | Facility: HOSPITAL | Age: 54
End: 2024-03-25
Payer: COMMERCIAL

## 2024-03-25 LAB
25(OH)D3 SERPL-MCNC: 60.5 NG/ML (ref 30–100)
ALBUMIN SERPL-MCNC: 4.5 G/DL (ref 3.5–5.2)
ALBUMIN/GLOB SERPL: 1.7 G/DL
ALP SERPL-CCNC: 95 U/L (ref 39–117)
ALT SERPL W P-5'-P-CCNC: 17 U/L (ref 1–33)
ANION GAP SERPL CALCULATED.3IONS-SCNC: 8 MMOL/L (ref 5–15)
AST SERPL-CCNC: 17 U/L (ref 1–32)
BASOPHILS # BLD AUTO: 0.04 10*3/MM3 (ref 0–0.2)
BASOPHILS NFR BLD AUTO: 1 % (ref 0–1.5)
BILIRUB SERPL-MCNC: 0.4 MG/DL (ref 0–1.2)
BUN SERPL-MCNC: 17 MG/DL (ref 6–20)
BUN/CREAT SERPL: 18.7 (ref 7–25)
CALCIUM SPEC-SCNC: 9.6 MG/DL (ref 8.6–10.5)
CHLORIDE SERPL-SCNC: 104 MMOL/L (ref 98–107)
CHOLEST SERPL-MCNC: 238 MG/DL (ref 0–200)
CO2 SERPL-SCNC: 29 MMOL/L (ref 22–29)
CREAT SERPL-MCNC: 0.91 MG/DL (ref 0.57–1)
DEPRECATED RDW RBC AUTO: 43.6 FL (ref 37–54)
EGFRCR SERPLBLD CKD-EPI 2021: 75.6 ML/MIN/1.73
EOSINOPHIL # BLD AUTO: 0.14 10*3/MM3 (ref 0–0.4)
EOSINOPHIL NFR BLD AUTO: 3.4 % (ref 0.3–6.2)
ERYTHROCYTE [DISTWIDTH] IN BLOOD BY AUTOMATED COUNT: 12.8 % (ref 12.3–15.4)
GLOBULIN UR ELPH-MCNC: 2.6 GM/DL
GLUCOSE SERPL-MCNC: 103 MG/DL (ref 65–99)
HBA1C MFR BLD: 5.4 % (ref 4.8–5.6)
HCT VFR BLD AUTO: 41 % (ref 34–46.6)
HDLC SERPL-MCNC: 62 MG/DL (ref 40–60)
HGB BLD-MCNC: 13.3 G/DL (ref 12–15.9)
IMM GRANULOCYTES # BLD AUTO: 0.01 10*3/MM3 (ref 0–0.05)
IMM GRANULOCYTES NFR BLD AUTO: 0.2 % (ref 0–0.5)
LDLC SERPL CALC-MCNC: 159 MG/DL (ref 0–100)
LDLC/HDLC SERPL: 2.52 {RATIO}
LYMPHOCYTES # BLD AUTO: 1.43 10*3/MM3 (ref 0.7–3.1)
LYMPHOCYTES NFR BLD AUTO: 34.5 % (ref 19.6–45.3)
MCH RBC QN AUTO: 29.8 PG (ref 26.6–33)
MCHC RBC AUTO-ENTMCNC: 32.4 G/DL (ref 31.5–35.7)
MCV RBC AUTO: 91.9 FL (ref 79–97)
MONOCYTES # BLD AUTO: 0.33 10*3/MM3 (ref 0.1–0.9)
MONOCYTES NFR BLD AUTO: 8 % (ref 5–12)
NEUTROPHILS NFR BLD AUTO: 2.19 10*3/MM3 (ref 1.7–7)
NEUTROPHILS NFR BLD AUTO: 52.9 % (ref 42.7–76)
NRBC BLD AUTO-RTO: 0 /100 WBC (ref 0–0.2)
PLATELET # BLD AUTO: 199 10*3/MM3 (ref 140–450)
PMV BLD AUTO: 12 FL (ref 6–12)
POTASSIUM SERPL-SCNC: 4.4 MMOL/L (ref 3.5–5.2)
PROT SERPL-MCNC: 7.1 G/DL (ref 6–8.5)
RBC # BLD AUTO: 4.46 10*6/MM3 (ref 3.77–5.28)
SODIUM SERPL-SCNC: 141 MMOL/L (ref 136–145)
T4 FREE SERPL-MCNC: 1.09 NG/DL (ref 0.93–1.7)
TRIGL SERPL-MCNC: 98 MG/DL (ref 0–150)
TSH SERPL DL<=0.05 MIU/L-ACNC: 5.18 UIU/ML (ref 0.27–4.2)
VIT B12 BLD-MCNC: 863 PG/ML (ref 211–946)
VLDLC SERPL-MCNC: 17 MG/DL (ref 5–40)
WBC NRBC COR # BLD AUTO: 4.14 10*3/MM3 (ref 3.4–10.8)

## 2024-03-25 PROCEDURE — 84439 ASSAY OF FREE THYROXINE: CPT | Performed by: NURSE PRACTITIONER

## 2024-03-25 PROCEDURE — 83036 HEMOGLOBIN GLYCOSYLATED A1C: CPT | Performed by: NURSE PRACTITIONER

## 2024-03-25 PROCEDURE — 80050 GENERAL HEALTH PANEL: CPT | Performed by: NURSE PRACTITIONER

## 2024-03-25 PROCEDURE — 36415 COLL VENOUS BLD VENIPUNCTURE: CPT | Performed by: NURSE PRACTITIONER

## 2024-03-25 PROCEDURE — 82607 VITAMIN B-12: CPT | Performed by: NURSE PRACTITIONER

## 2024-03-25 PROCEDURE — 82306 VITAMIN D 25 HYDROXY: CPT | Performed by: NURSE PRACTITIONER

## 2024-03-25 PROCEDURE — 80061 LIPID PANEL: CPT | Performed by: NURSE PRACTITIONER

## 2024-03-27 ENCOUNTER — PATIENT MESSAGE (OUTPATIENT)
Dept: INTERNAL MEDICINE | Facility: CLINIC | Age: 54
End: 2024-03-27
Payer: COMMERCIAL

## 2024-03-27 RX ORDER — LEVOTHYROXINE SODIUM 0.03 MG/1
25 TABLET ORAL
Qty: 90 TABLET | Refills: 0 | Status: SHIPPED | OUTPATIENT
Start: 2024-03-27

## 2024-03-27 NOTE — PROGRESS NOTES
Please contact patient and let her know that her TSH is abnormal recommend thyroid medication or we can refer her to an endocrinologist as it has increased to years.  Her glucose total cholesterol and LDL are all recommend dietary modifications along with exercise if patient is agreeable to thyroid medication recommend starting on 25 mcg daily and repeat TSH in 2 months as we may have to adjust it to 50 mcg and she needs to take it by itself on an empty stomach.

## 2024-05-13 ENCOUNTER — HOSPITAL ENCOUNTER (OUTPATIENT)
Dept: MAMMOGRAPHY | Facility: HOSPITAL | Age: 54
Discharge: HOME | End: 2024-05-13
Admitting: NURSE PRACTITIONER
Payer: COMMERCIAL

## 2024-05-13 PROCEDURE — 77063 BREAST TOMOSYNTHESIS BI: CPT

## 2024-05-13 PROCEDURE — 77067 SCR MAMMO BI INCL CAD: CPT

## 2024-05-15 PROCEDURE — 77067 SCR MAMMO BI INCL CAD: CPT | Performed by: RADIOLOGY

## 2024-05-15 PROCEDURE — 77063 BREAST TOMOSYNTHESIS BI: CPT | Performed by: RADIOLOGY

## 2024-05-22 DIAGNOSIS — E78.2 MIXED HYPERLIPIDEMIA: Primary | ICD-10-CM

## 2024-05-22 DIAGNOSIS — R79.89 ELEVATED TSH: ICD-10-CM

## 2024-05-28 ENCOUNTER — LAB (OUTPATIENT)
Dept: LAB | Facility: HOSPITAL | Age: 54
End: 2024-05-28
Payer: COMMERCIAL

## 2024-05-28 PROCEDURE — 84443 ASSAY THYROID STIM HORMONE: CPT | Performed by: NURSE PRACTITIONER

## 2024-05-29 LAB — TSH SERPL DL<=0.05 MIU/L-ACNC: 3.23 UIU/ML (ref 0.27–4.2)

## 2024-06-19 RX ORDER — LEVOTHYROXINE SODIUM 0.03 MG/1
25 TABLET ORAL
Qty: 90 TABLET | Refills: 0 | Status: SHIPPED | OUTPATIENT
Start: 2024-06-19

## 2024-09-05 ENCOUNTER — OFFICE VISIT (OUTPATIENT)
Dept: INTERNAL MEDICINE | Facility: CLINIC | Age: 54
End: 2024-09-05
Payer: COMMERCIAL

## 2024-09-05 VITALS
DIASTOLIC BLOOD PRESSURE: 79 MMHG | HEART RATE: 72 BPM | BODY MASS INDEX: 30.3 KG/M2 | TEMPERATURE: 98.4 F | OXYGEN SATURATION: 100 % | RESPIRATION RATE: 14 BRPM | SYSTOLIC BLOOD PRESSURE: 143 MMHG | HEIGHT: 63 IN | WEIGHT: 171 LBS

## 2024-09-05 DIAGNOSIS — R53.83 FATIGUE, UNSPECIFIED TYPE: ICD-10-CM

## 2024-09-05 DIAGNOSIS — R63.5 UNEXPLAINED WEIGHT GAIN: ICD-10-CM

## 2024-09-05 DIAGNOSIS — R23.2 HOT FLASHES: ICD-10-CM

## 2024-09-05 DIAGNOSIS — R03.0 ELEVATED BLOOD PRESSURE READING: ICD-10-CM

## 2024-09-05 DIAGNOSIS — R79.89 ELEVATED TSH: ICD-10-CM

## 2024-09-05 DIAGNOSIS — E78.2 MIXED HYPERLIPIDEMIA: Primary | ICD-10-CM

## 2024-09-05 PROCEDURE — 99214 OFFICE O/P EST MOD 30 MIN: CPT | Performed by: NURSE PRACTITIONER

## 2024-09-05 RX ORDER — LEVOTHYROXINE SODIUM 50 MCG
50 TABLET ORAL DAILY
Qty: 90 TABLET | Refills: 3 | Status: SHIPPED | OUTPATIENT
Start: 2024-09-05

## 2024-09-05 NOTE — PROGRESS NOTES
Chief Complaint / Reason:      Chief Complaint   Patient presents with    Thyroid Problem     Discuss labs, weight gain and fatigue        Subjective     History of Present Illness  The patient is a 53-year-old female who presents for evaluation of weight gain, fatigue, and to discuss blood work.    She reports experiencing fatigue, which she initially attributed to her medication. After a week on the medication, she noticed an improvement in her energy levels, allowing her to stay awake until 10:30 or 11:00 PM. Recently, she has been feeling fatigued again and suspects it may be related to her hormones. Her sleep is often disturbed, leading her to take sleep aids, but she does not wake up feeling tired. She occasionally takes magnesium as a sleep aid. She experiences hot flashes, which are becoming more frequent, and occasional nausea. She has not been exercising recently due to a lack of motivation. She internalizes stress, which manifests physically. She is not phan or irritable, but can become emotional. She gets frustrated and down on herself.    She mentions having high blood pressure, despite not making any changes to her diet. She was previously on Optavia and had elevated cholesterol levels. She expresses concern about her family history of dementia and heart disease. She has undergone a CT coronary calcium score test and an echocardiogram. She reports no fluid retention. She tries to stay hydrated by drinking at least half a gallon of water daily. She denies any swelling or tightness in her rings.    She believes her weight gain is contributing to her snoring and is reluctant to use a CPAP machine. She admits to snoring when she is extremely tired or congested. She has gained weight and experienced joint pain during stressful periods, such as when her mother was dying. She found relief from muscle relaxers and physical therapy for her shoulder, although it is not completely resolved.    She had an MRI  three years ago and sometimes struggles with word finding. She does not feel depressed.    FAMILY HISTORY  Her mother had high cholesterol. Her sister has high cholesterol. She has a family history of dementia and heart disease on her mother's side. Her grandfather had heart disease.  Answers submitted by the patient for this visit:  Other (Submitted on 9/4/2024)  Please describe your symptoms.: I want to discuss weight gain, cholesterol levels, thyroid, energy levels  Have you had these symptoms before?: Yes  How long have you been having these symptoms?: Greater than 2 weeks  Primary Reason for Visit (Submitted on 9/4/2024)  What is the primary reason for your visit?: Other    History taken from: patient    PMH/FH/Social History were reviewed and updated appropriately in the electronic medical record.   Past Medical History:   Diagnosis Date    Deep vein thrombosis     Hyperlipidemia 2019    Hypothyroidism 2024    Scoliosis 1984     Past Surgical History:   Procedure Laterality Date    COLONOSCOPY  10/21    TONSILLECTOMY       Social History     Socioeconomic History    Marital status:    Tobacco Use    Smoking status: Never    Smokeless tobacco: Never   Vaping Use    Vaping status: Never Used   Substance and Sexual Activity    Alcohol use: Not Currently     Alcohol/week: 2.0 standard drinks of alcohol     Comment: occas    Drug use: Never    Sexual activity: Yes     Partners: Male     Birth control/protection: Post-menopausal, Vasectomy     Family History   Problem Relation Age of Onset    Hyperlipidemia Mother     Dementia Mother     Breast cancer Mother 58    Cancer Mother     Asthma Father     COPD Father     No Known Problems Sister     Cancer Maternal Grandmother     Hearing loss Maternal Grandmother     Breast cancer Maternal Aunt     Heart disease Maternal Grandfather     Ovarian cancer Neg Hx        Review of Systems:   Review of Systems      All other systems were reviewed and are negative.   Exceptions are noted in the subjective or above.      Objective     Vital Signs  Vitals:    09/05/24 1518   BP: 143/79   Pulse: 72   Resp: 14   Temp: 98.4 °F (36.9 °C)   SpO2: 100%       Body mass index is 30.29 kg/m².  BMI is >= 30 and <35. (Class 1 Obesity). The following options were offered after discussion;: exercise counseling/recommendations and nutrition counseling/recommendations       Physical Exam  Vitals and nursing note reviewed.   Constitutional:       General: She is not in acute distress.     Appearance: She is well-developed. She is obese.   Cardiovascular:      Rate and Rhythm: Normal rate and regular rhythm.      Pulses: Normal pulses.      Heart sounds: Normal heart sounds.   Pulmonary:      Effort: Pulmonary effort is normal.      Breath sounds: Normal breath sounds. No wheezing.   Chest:      Chest wall: No tenderness.   Skin:     General: Skin is warm and dry.      Capillary Refill: Capillary refill takes less than 2 seconds.      Coloration: Skin is not pale.      Findings: No erythema or rash.   Neurological:      Mental Status: She is alert and oriented to person, place, and time.   Psychiatric:         Behavior: Behavior normal.         Thought Content: Thought content normal.         Judgment: Judgment normal.              Results Review:    I reviewed the patient's new clinical results.       Medication Review:     Current Outpatient Medications:     cyclobenzaprine (FLEXERIL) 5 MG tablet, Take 1 tablet by mouth At Night As Needed for Muscle Spasms., Disp: 20 tablet, Rfl: 1    levothyroxine (SYNTHROID, LEVOTHROID) 25 MCG tablet, Take 1 tablet by mouth Every Morning., Disp: 90 tablet, Rfl: 0    tretinoin (RETIN-A) 0.025 % cream, Apply thinly to face at night as tolerated. Wash off in the morning, Then moisturize, Disp: 20 g, Rfl: 2    Ascorbic Acid (Vitamin C) 500 MG capsule, Take 500 mg by mouth Daily. (Patient not taking: Reported on 9/5/2024), Disp: , Rfl:     cholecalciferol (VITAMIN  D3) 25 MCG (1000 UT) tablet, Take 1 tablet by mouth Daily. (Patient not taking: Reported on 9/5/2024), Disp: , Rfl:     Multiple Vitamins-Minerals (MULTIVITAMIN WITH MINERALS) tablet tablet, Take 1 tablet by mouth Daily. (Patient not taking: Reported on 9/5/2024), Disp: , Rfl:     vitamin B-12 (CYANOCOBALAMIN) 1000 MCG tablet, Take 1 tablet by mouth Daily. (Patient not taking: Reported on 9/5/2024), Disp: , Rfl:     Zinc 50 MG capsule, Take 1 capsule by mouth Daily. (Patient not taking: Reported on 9/5/2024), Disp: , Rfl:     Diagnoses and all orders for this visit:    Mixed hyperlipidemia    Hot flashes    Elevated blood pressure reading    Fatigue, unspecified type    Unexplained weight gain  -     Synthroid 50 MCG tablet; Take 1 tablet by mouth Daily.    Elevated TSH  -     Synthroid 50 MCG tablet; Take 1 tablet by mouth Daily.      Assessment & Plan  1. Weight gain.  The weight gain may be associated with her thyroid condition, as her TSH is nearing 4. She is advised to switch from levothyroxine to Synthroid 50 mcg, to be taken on an empty stomach 30 minutes before meals. She should continue this regimen for the next 2 months, after which her TSH levels will be rechecked. She is also advised to increase her protein intake to 50 to 60 g per day and to incorporate regular walking into her routine. She should monitor her blood pressure regularly and maintain adequate hydration. If her symptoms do not improve, other hormone levels may be checked.    2. Fatigue.  Fatigue could be related to her thyroid condition or potentially sleep apnea, given her snoring. She should monitor her sleep patterns and consider a sleep study if her sleep quality does not improve. She is advised to avoid antihistamines and instead consider magnesium for sleep aid. She should also monitor her caffeine intake and ensure she is consuming adequate protein.    3. Elevated blood pressure.  Her blood pressure is elevated, which could be  contributing to her fatigue. She is advised to monitor her blood pressure regularly. If her blood pressure remains high, further evaluation and potential treatment may be necessary.    4. Hot flashes.  Hot flashes may be due to menopause or hypertension. She is advised to monitor her blood pressure and consider non-hormonal options for managing hot flashes, such as black cohosh or Ashwagandha. If her symptoms persist, further evaluation may be necessary.      Return if symptoms worsen or fail to improve.    TIAGO Aguirre  09/05/2024    Patient or patient representative verbalized consent for the use of Ambient Listening during the visit with  TIAGO Aguirre for chart documentation.

## 2024-10-19 ENCOUNTER — HOSPITAL ENCOUNTER (EMERGENCY)
Facility: HOSPITAL | Age: 54
Discharge: HOME | End: 2024-10-19
Attending: STUDENT IN AN ORGANIZED HEALTH CARE EDUCATION/TRAINING PROGRAM
Payer: COMMERCIAL

## 2024-10-19 ENCOUNTER — APPOINTMENT (OUTPATIENT)
Dept: GENERAL RADIOLOGY | Facility: HOSPITAL | Age: 54
End: 2024-10-19
Payer: COMMERCIAL

## 2024-10-19 VITALS
RESPIRATION RATE: 18 BRPM | BODY MASS INDEX: 30.3 KG/M2 | HEART RATE: 91 BPM | OXYGEN SATURATION: 99 % | WEIGHT: 171 LBS | TEMPERATURE: 97.8 F | HEIGHT: 63 IN | DIASTOLIC BLOOD PRESSURE: 97 MMHG | SYSTOLIC BLOOD PRESSURE: 144 MMHG

## 2024-10-19 DIAGNOSIS — S60.229A CONTUSION OF DORSUM OF HAND: Primary | ICD-10-CM

## 2024-10-19 PROCEDURE — 99283 EMERGENCY DEPT VISIT LOW MDM: CPT

## 2024-10-19 PROCEDURE — 73130 X-RAY EXAM OF HAND: CPT

## 2024-10-19 RX ORDER — BACITRACIN ZINC 500 [USP'U]/G
1 OINTMENT TOPICAL ONCE
Status: COMPLETED | OUTPATIENT
Start: 2024-10-19 | End: 2024-10-19

## 2024-10-19 RX ADMIN — BACITRACIN ZINC 1 APPLICATION: 500 OINTMENT TOPICAL at 12:57

## 2024-10-20 NOTE — ED PROVIDER NOTES
Pt Name: Shellie Palacio  MRN: [id number]  : 1970  Date of Encounter: 10/19/2024    PCP: Leatha Richards APRN      Subjective    History of Present Illness:    Chief Complaint: Left hand pain after fall    History of Present Illness: Shellie Palacio is a 54 y.o. female who presents to the ER complaining of left hand pain after ground-level fall That started just prior to arrival.  Patient states she was walking outside when she tripped over a raised area concrete fell onto outstretched hands has abrasions to palms of the hand.  Patient is complaining of pain to her left hand with some minimal swelling.  Pain is described as Dull, Constant, and does not radiate  Patient rates pain as a 6 on a ten scale.    Triage Vitals:    ED Triage Vitals [10/19/24 1224]   Temp Heart Rate Resp BP SpO2   97.8 °F (36.6 °C) 91 18 144/97 99 %      Temp src Heart Rate Source Patient Position BP Location FiO2 (%)   Oral Monitor Sitting Left arm --       Nurses Notes reviewed and agree, including vitals, allergies, social history and prior medical history.     Decadron [dexamethasone]    Past Medical History:   Diagnosis Date    Deep vein thrombosis     Hyperlipidemia     Hypothyroidism     Scoliosis        Past Surgical History:   Procedure Laterality Date    COLONOSCOPY  10/21    TONSILLECTOMY         Social History     Socioeconomic History    Marital status:    Tobacco Use    Smoking status: Never    Smokeless tobacco: Never   Vaping Use    Vaping status: Never Used   Substance and Sexual Activity    Alcohol use: Not Currently     Alcohol/week: 2.0 standard drinks of alcohol     Comment: occas    Drug use: Never    Sexual activity: Yes     Partners: Male     Birth control/protection: Post-menopausal, Vasectomy       Family History   Problem Relation Age of Onset    Hyperlipidemia Mother     Dementia Mother     Breast cancer Mother 58    Cancer Mother     Asthma Father     COPD Father     No Known  Problems Sister     Cancer Maternal Grandmother     Hearing loss Maternal Grandmother     Breast cancer Maternal Aunt     Heart disease Maternal Grandfather     Ovarian cancer Neg Hx        REVIEW OF SYSTEMS:     All systems reviewed and not pertinent unless noted.    Review of Systems   Musculoskeletal:  Positive for myalgias.   Skin:  Positive for wound.   All other systems reviewed and are negative.      Objective    Physical Exam  Vitals and nursing note reviewed.   Constitutional:       Appearance: Normal appearance.   HENT:      Head: Normocephalic and atraumatic.   Eyes:      Extraocular Movements: Extraocular movements intact.      Pupils: Pupils are equal, round, and reactive to light.   Cardiovascular:      Rate and Rhythm: Normal rate and regular rhythm.      Pulses: Normal pulses.      Heart sounds: Normal heart sounds.   Pulmonary:      Effort: Pulmonary effort is normal.      Breath sounds: Normal breath sounds.   Abdominal:      General: Abdomen is flat. Bowel sounds are normal.      Palpations: Abdomen is soft.   Musculoskeletal:         General: Tenderness present.      Left hand: Swelling and tenderness present.      Cervical back: Normal range of motion and neck supple.   Skin:     Capillary Refill: Capillary refill takes less than 2 seconds.      Comments: Bilateral abrasions to palmar aspects of hands   Neurological:      General: No focal deficit present.      Mental Status: She is alert and oriented to person, place, and time. Mental status is at baseline.      GCS: GCS eye subscore is 4. GCS verbal subscore is 5. GCS motor subscore is 6.      Sensory: Sensation is intact.      Motor: Motor function is intact.      Gait: Gait is intact.   Psychiatric:         Attention and Perception: Attention and perception normal.         Mood and Affect: Mood and affect normal.         Speech: Speech normal.         Behavior: Behavior normal. Behavior is cooperative.                            Procedures    ED Course:    No orders to display       ED Course as of 10/19/24 2025   Sat Oct 19, 2024   1314 Radiographic images from left hand x-ray independently interpreted by me prior to radiology overread and shows no acute fracture in the hand bones or wrist. [KH]      ED Course User Index  [KH] Boby GinoTIAGO preciado       Orders placed during this visit:    Orders Placed This Encounter   Procedures    XR Hand 3+ View Left       LAB Results:    Lab Results (last 24 hours)       ** No results found for the last 24 hours. **             If labs were ordered, I have independently reviewed the results and considered them in the diagnosis and treatment plan for the patient    RADIOLOGY    XR Hand 3+ View Left    Result Date: 10/19/2024  PROCEDURE: XR HAND 3+ VW LEFT-  History: Fall on outstretched hand with pain; S60.229A-Contusion of unspecified hand, initial encounter  COMPARISON: None.  FINDINGS:  A 3 view exam demonstrates no displaced fracture or dislocation. Minimal degenerative change. No soft tissue abnormality is seen. Consider MRI if symptoms persist.      Impression: No displaced fracture.     This report was signed and finalized on 10/19/2024 3:28 PM by Tanner Woodall DO.        If I have ordered, I have independently reviewed the above noted radiographic studies.  Please see the radiologist dictation for the official interpretation    Medications given to patient in the ER    Medications   bacitracin ointment 1 Application (1 Application Topical Given 10/19/24 1257)       AS OF 20:25 EDT VITALS:    BP - 144/97  HR - 91  TEMP - 97.8 °F (36.6 °C) (Oral)  O2 SATS - 99%         Shared Decision Making: After my consideration of the clinical presentation and laboratory/radiology studies obtained, I have discussed the findings with the patient/patient representative who is in agreement with the treatment plan and final disposition. Risks and benefits of discharge and/or observation admission were  discussed.  Final disposition of the patient will be discharged home.  Patient is requested to follow-up with primary care provider and specialist in 1 week following final discharge.      Medical Decision Making  Shellie Palacio is a 54 y.o. female who presents to the ER complaining of left hand pain after ground-level fall That started just prior to arrival.  Patient states she was walking outside when she tripped over a raised area concrete fell onto outstretched hands has abrasions to palms of the hand.  Patient is complaining of pain to her left hand with some minimal swelling.  Pain is described as Dull, Constant, and does not radiate  Patient rates pain as a 6 on a ten scale.    DDX: includes but is not limited to: Hand contusion, bilateral hand abrasions, hand fracture, other    Problems Addressed:  Contusion of dorsum of hand: complicated acute illness or injury    Amount and/or Complexity of Data Reviewed  Radiology: ordered and independent interpretation performed. Decision-making details documented in ED Course.     Details: I have personally reviewed and documented all results  Discussion of management or test interpretation with external provider(s): Discussed assessment, treatment and plan with ER attending    Risk  OTC drugs.  Risk Details: I have discussed with patient the finding of the test preformed today. Patient has been diagnosed with contusion of dorsum of hands and will be discharged home.  Patient requested to follow-up with primary care provider within the next 7 days for reevaluation. Strict return precautions have been given and patient verbalizes understanding        Final diagnoses:   Contusion of dorsum of hand       Please note that portions of this document were completed using voice recognition dictation software.       Gino Landis, APRN  10/19/24 2025

## 2024-11-04 DIAGNOSIS — E03.9 HYPOTHYROIDISM, UNSPECIFIED TYPE: Primary | ICD-10-CM

## 2024-11-05 ENCOUNTER — LAB (OUTPATIENT)
Dept: LAB | Facility: HOSPITAL | Age: 54
End: 2024-11-05
Payer: COMMERCIAL

## 2024-11-05 PROCEDURE — 84443 ASSAY THYROID STIM HORMONE: CPT | Performed by: NURSE PRACTITIONER

## 2024-11-05 PROCEDURE — 80061 LIPID PANEL: CPT | Performed by: NURSE PRACTITIONER

## 2024-11-06 LAB
CHOLEST SERPL-MCNC: 266 MG/DL (ref 0–200)
HDLC SERPL-MCNC: 54 MG/DL (ref 40–60)
LDLC SERPL CALC-MCNC: 174 MG/DL (ref 0–100)
LDLC/HDLC SERPL: 3.17 {RATIO}
TRIGL SERPL-MCNC: 203 MG/DL (ref 0–150)
TSH SERPL DL<=0.05 MIU/L-ACNC: 2.47 UIU/ML (ref 0.27–4.2)
VLDLC SERPL-MCNC: 38 MG/DL (ref 5–40)

## 2024-12-01 ENCOUNTER — PATIENT MESSAGE (OUTPATIENT)
Dept: INTERNAL MEDICINE | Facility: CLINIC | Age: 54
End: 2024-12-01
Payer: COMMERCIAL

## 2024-12-06 ENCOUNTER — PATIENT MESSAGE (OUTPATIENT)
Dept: INTERNAL MEDICINE | Facility: CLINIC | Age: 54
End: 2024-12-06
Payer: COMMERCIAL

## 2024-12-06 RX ORDER — VALACYCLOVIR HYDROCHLORIDE 1 G/1
TABLET, FILM COATED ORAL
Qty: 12 TABLET | Refills: 5 | Status: SHIPPED | OUTPATIENT
Start: 2024-12-06 | End: 2024-12-06 | Stop reason: SDUPTHER

## 2024-12-06 RX ORDER — VALACYCLOVIR HYDROCHLORIDE 1 G/1
1000 TABLET, FILM COATED ORAL 2 TIMES DAILY
Qty: 12 TABLET | Refills: 5 | Status: SHIPPED | OUTPATIENT
Start: 2024-12-06

## 2025-02-27 ENCOUNTER — TELEMEDICINE (OUTPATIENT)
Dept: FAMILY MEDICINE CLINIC | Facility: TELEHEALTH | Age: 55
End: 2025-02-27
Payer: COMMERCIAL

## 2025-02-27 DIAGNOSIS — Z20.828 EXPOSURE TO THE FLU: ICD-10-CM

## 2025-02-27 DIAGNOSIS — J06.9 VIRAL UPPER RESPIRATORY TRACT INFECTION: Primary | ICD-10-CM

## 2025-02-27 RX ORDER — OSELTAMIVIR PHOSPHATE 75 MG/1
75 CAPSULE ORAL 2 TIMES DAILY
Qty: 10 CAPSULE | Refills: 0 | Status: SHIPPED | OUTPATIENT
Start: 2025-02-27 | End: 2025-03-04

## 2025-02-27 RX ORDER — BROMPHENIRAMINE MALEATE, PSEUDOEPHEDRINE HYDROCHLORIDE, AND DEXTROMETHORPHAN HYDROBROMIDE 2; 30; 10 MG/5ML; MG/5ML; MG/5ML
10 SYRUP ORAL 4 TIMES DAILY PRN
Qty: 240 ML | Refills: 0 | Status: SHIPPED | OUTPATIENT
Start: 2025-02-27

## 2025-02-27 NOTE — PROGRESS NOTES
Subjective   Chief Complaint   Patient presents with    URI       Shellie Palacio is a 54 y.o. female.     History of Present Illness  Patient reports cough, fever, body aches, congestion and headache for the past 2 days.  She feels like she has the flu.  She has had recent flu exposure at work.  URI   This is a new problem. Episode onset: 2 days. The problem has been gradually worsening. Associated symptoms include congestion, coughing and headaches. Pertinent negatives include no chest pain, diarrhea, nausea, sinus pain, sore throat, vomiting or wheezing. She has tried sleep (alkaseltzer) for the symptoms.        Allergies   Allergen Reactions    Decadron [Dexamethasone] Other (See Comments)     numbness       Past Medical History:   Diagnosis Date    Deep vein thrombosis     Hyperlipidemia 2019    Hypothyroidism 2024    Scoliosis 1984       Past Surgical History:   Procedure Laterality Date    COLONOSCOPY  10/21    TONSILLECTOMY         Social History     Socioeconomic History    Marital status:    Tobacco Use    Smoking status: Never    Smokeless tobacco: Never   Vaping Use    Vaping status: Never Used   Substance and Sexual Activity    Alcohol use: Not Currently     Alcohol/week: 2.0 standard drinks of alcohol     Comment: occas    Drug use: Never    Sexual activity: Yes     Partners: Male     Birth control/protection: Post-menopausal, Vasectomy       Family History   Problem Relation Age of Onset    Hyperlipidemia Mother     Dementia Mother     Breast cancer Mother 58    Cancer Mother     Asthma Father     COPD Father     No Known Problems Sister     Cancer Maternal Grandmother     Hearing loss Maternal Grandmother     Breast cancer Maternal Aunt     Heart disease Maternal Grandfather     Ovarian cancer Neg Hx          Current Outpatient Medications:     brompheniramine-pseudoephedrine-DM 30-2-10 MG/5ML syrup, Take 10 mL by mouth 4 (Four) Times a Day As Needed for Congestion or Cough., Disp: 240 mL,  Rfl: 0    oseltamivir (Tamiflu) 75 MG capsule, Take 1 capsule by mouth 2 (Two) Times a Day for 5 days., Disp: 10 capsule, Rfl: 0    Synthroid 50 MCG tablet, Take 1 tablet by mouth Daily., Disp: 90 tablet, Rfl: 3    tretinoin (RETIN-A) 0.025 % cream, Apply thinly to face at night as tolerated. Wash off in the morning, Then moisturize, Disp: 20 g, Rfl: 2    valACYclovir (Valtrex) 1000 MG tablet, Take 1 tablet by mouth 2 (Two) Times a Day for cold sores. Take at first sign of outbreak., Disp: 12 tablet, Rfl: 5      Review of Systems   Constitutional:  Positive for activity change, chills, diaphoresis, fatigue and fever.   HENT:  Positive for congestion. Negative for sore throat.    Respiratory:  Positive for cough. Negative for chest tightness, shortness of breath and wheezing.    Cardiovascular:  Negative for chest pain.   Gastrointestinal:  Negative for diarrhea, nausea and vomiting.   Musculoskeletal:  Positive for arthralgias and myalgias.   Neurological:  Positive for headache.        There were no vitals filed for this visit.    Objective   Physical Exam  Constitutional:       General: She is not in acute distress.     Appearance: She is ill-appearing. She is not toxic-appearing or diaphoretic.   HENT:      Head: Normocephalic.      Nose: Congestion present.      Mouth/Throat:      Lips: Pink.      Mouth: Mucous membranes are moist.   Pulmonary:      Effort: Pulmonary effort is normal.   Neurological:      Mental Status: She is alert and oriented to person, place, and time.          Procedures     Assessment & Plan   Diagnoses and all orders for this visit:    1. Viral upper respiratory tract infection (Primary)    2. Exposure to the flu    Other orders  -     oseltamivir (Tamiflu) 75 MG capsule; Take 1 capsule by mouth 2 (Two) Times a Day for 5 days.  Dispense: 10 capsule; Refill: 0  -     brompheniramine-pseudoephedrine-DM 30-2-10 MG/5ML syrup; Take 10 mL by mouth 4 (Four) Times a Day As Needed for Congestion  or Cough.  Dispense: 240 mL; Refill: 0      Continue treating symptoms.  Alternate tylenol and motrin for pain and/or fever, stay hydrated and rest.     If symptoms worsen or do not improve follow up with your PCP or visit your nearest Urgent Care Center or ER.            PLAN: Discussed dosing, side effects, recommended other symptomatic care.  Patient should follow up with primary care provider, Urgent Care or ER if symptoms worsen, fail to resolve or other symptoms need attention. Patient/family agree to the above.         TIAGO Gregg     Mode of Visit: Video  Location of patient: -HOME-  Location of provider: +HOME+  You have chosen to receive care through a telehealth visit.  The patient has signed the video visit consent form.  The visit included audio and video interaction. No technical issues occurred during this visit.    The use of a video visit has been reviewed with the patient and verbal informed consent has been obtained. Myself and Shellie Palacio participated in this visit. The patient is located at [address] [place] [address]. I am located in [place], [place]. Mychart and Zoom were utilized.        This visit was performed via Telehealth.  This patient has been instructed to follow-up with their primary care provider if their symptoms worsen or the treatment provided does not resolve their illness.

## 2025-02-27 NOTE — PATIENT INSTRUCTIONS
Continue treating symptoms.  Alternate tylenol and motrin for pain and/or fever, stay hydrated and rest.     If symptoms worsen or do not improve follow up with your PCP or visit your nearest Urgent Care Center or ER.

## 2025-03-24 ENCOUNTER — OFFICE VISIT (OUTPATIENT)
Dept: INTERNAL MEDICINE | Facility: CLINIC | Age: 55
End: 2025-03-24
Payer: COMMERCIAL

## 2025-03-24 VITALS
OXYGEN SATURATION: 96 % | HEIGHT: 63 IN | BODY MASS INDEX: 30.55 KG/M2 | RESPIRATION RATE: 18 BRPM | SYSTOLIC BLOOD PRESSURE: 130 MMHG | DIASTOLIC BLOOD PRESSURE: 82 MMHG | TEMPERATURE: 97.5 F | WEIGHT: 172.4 LBS | HEART RATE: 79 BPM

## 2025-03-24 DIAGNOSIS — Z12.31 ENCOUNTER FOR SCREENING MAMMOGRAM FOR MALIGNANT NEOPLASM OF BREAST: ICD-10-CM

## 2025-03-24 DIAGNOSIS — Z13.228 SCREENING FOR ENDOCRINE, NUTRITIONAL, METABOLIC AND IMMUNITY DISORDER: ICD-10-CM

## 2025-03-24 DIAGNOSIS — Z13.29 SCREENING FOR ENDOCRINE, NUTRITIONAL, METABOLIC AND IMMUNITY DISORDER: ICD-10-CM

## 2025-03-24 DIAGNOSIS — Z13.0 SCREENING FOR ENDOCRINE, NUTRITIONAL, METABOLIC AND IMMUNITY DISORDER: ICD-10-CM

## 2025-03-24 DIAGNOSIS — E03.9 HYPOTHYROIDISM, UNSPECIFIED TYPE: ICD-10-CM

## 2025-03-24 DIAGNOSIS — E78.2 MIXED HYPERLIPIDEMIA: ICD-10-CM

## 2025-03-24 DIAGNOSIS — Z00.00 PHYSICAL EXAM, ANNUAL: Primary | ICD-10-CM

## 2025-03-24 DIAGNOSIS — Z13.21 SCREENING FOR ENDOCRINE, NUTRITIONAL, METABOLIC AND IMMUNITY DISORDER: ICD-10-CM

## 2025-03-24 DIAGNOSIS — E55.9 VITAMIN D INSUFFICIENCY: ICD-10-CM

## 2025-03-24 RX ORDER — MAGNESIUM GLYCINATE 100 MG
200 CAPSULE ORAL DAILY
COMMUNITY

## 2025-03-24 NOTE — PROGRESS NOTES
Chief Complaint   Patient presents with    Annual Exam       Shellie Palacio is a 54 y.o. female and is here for a comprehensive physical exam.   History of Present Illness  The patient is a 54-year-old female presenting for an annual physical.    She monitors her blood pressure at home, typically around 110 systolic, suspecting white coat syndrome for elevated clinic readings. She maintains an active lifestyle with regular exercise and stationary bike workouts, recently disrupted by influenza but now resumed. She is not taking aspirin. Dental and vision health are up-to-date. She has a dermatologist but no recent consultation. No known hepatitis C exposure. CT calcium score in  was normal. Due for mammogram and colonoscopy next year.    She is increasing water intake and consumes protein shakes. On Synthroid for thyroid management, feeling more energetic than in 2024. Takes calcium with vitamin D.    She has a long-standing skin condition treated by a dermatologist. Recently noticed a new dry spot, possibly from dermaplaning, not itchy, with a small scab removed months ago.    FAMILY HISTORY  Strong family history of high cholesterol.    MEDICATIONS  Current: Synthroid, calcium with vitamin D.  Past: [facility].        History:  LMP: No LMP recorded (lmp unknown). Patient is postmenopausal.  Last pap date:   Abnormal pap? no  : 3  Para: 3  STD:none  Age of menarche:14  Sexually active:15  Abuse:none   (male)    Do you take any herbs or supplements that were not prescribed by a doctor? yes  Are you taking calcium supplements? yes  Are you taking aspirin daily? no      Health Habits:  Dental Exam. up to date  Eye Exam. up to date  Dermatology.not up to date - advised patient to schedule or closely monitor for changes in skin lesions  Exercise: 4 times/week.  Current exercise activities include: walking    Health Maintenance   Topic Date Due    ANNUAL PHYSICAL  2025    MAMMOGRAM   05/13/2025    COVID-19 Vaccine (4 - 2024-25 season) 04/07/2025 (Originally 9/1/2024)    Pneumococcal Vaccine 50+ (1 of 1 - PCV) 09/20/2025 (Originally 9/28/2020)    HEPATITIS C SCREENING  03/24/2026 (Originally 7/29/2016)    PAP SMEAR  03/31/2025    LIPID PANEL  11/05/2025    BMI FOLLOWUP  03/24/2026    COLORECTAL CANCER SCREENING  10/08/2026    TDAP/TD VACCINES (2 - Td or Tdap) 06/01/2031    INFLUENZA VACCINE  Completed    ZOSTER VACCINE  Completed       PMH, PSH, SocHx, FamHx, Allergies, and Medications: Reviewed and updated in the Visit Navigator.     Allergies   Allergen Reactions    Decadron [Dexamethasone] Other (See Comments)     numbness     Past Medical History:   Diagnosis Date    Colon polyp     Deep vein thrombosis     Hyperlipidemia 2019    Hypothyroidism 2024    Injury of back 12/17/21    Scoliosis 1984     Past Surgical History:   Procedure Laterality Date    COLONOSCOPY  10/21    TONSILLECTOMY       Social History     Socioeconomic History    Marital status:    Tobacco Use    Smoking status: Never     Passive exposure: Never    Smokeless tobacco: Never   Vaping Use    Vaping status: Never Used   Substance and Sexual Activity    Alcohol use: Not Currently     Alcohol/week: 2.0 standard drinks of alcohol     Comment: occas    Drug use: Never    Sexual activity: Yes     Partners: Male     Birth control/protection: Post-menopausal, Vasectomy     Family History   Problem Relation Age of Onset    Hyperlipidemia Mother     Dementia Mother     Breast cancer Mother 58    Cancer Mother     Asthma Father     COPD Father     No Known Problems Sister     Cancer Maternal Grandmother     Hearing loss Maternal Grandmother     Breast cancer Maternal Aunt     Heart disease Maternal Grandfather     Ovarian cancer Neg Hx        Review of Systems  Review of Systems      Vitals:    03/24/25 0904   BP: 130/82   Pulse: 79   Resp: 18   Temp: 97.5 °F (36.4 °C)   SpO2: 96%       Objective   /82 (BP Location: Left  "arm, Patient Position: Sitting, Cuff Size: Adult)   Pulse 79   Temp 97.5 °F (36.4 °C) (Infrared)   Resp 18   Ht 160 cm (62.99\")   Wt 78.2 kg (172 lb 6.4 oz)   LMP  (LMP Unknown)   SpO2 96%   BMI 30.55 kg/m²           Physical Exam  Vitals and nursing note reviewed.   Constitutional:       General: She is not in acute distress.     Appearance: Normal appearance. She is well-developed. She is obese.   HENT:      Head: Normocephalic and atraumatic.      Right Ear: Hearing, tympanic membrane and ear canal normal.      Left Ear: Hearing, tympanic membrane and ear canal normal.      Nose: Nose normal.      Mouth/Throat:      Mouth: Mucous membranes are dry.      Dentition: Normal dentition.   Eyes:      Conjunctiva/sclera: Conjunctivae normal.      Pupils: Pupils are equal, round, and reactive to light.   Neck:      Thyroid: No thyroid mass or thyromegaly.      Vascular: No carotid bruit or JVD.   Cardiovascular:      Rate and Rhythm: Normal rate and regular rhythm.      Pulses: Normal pulses.      Heart sounds: Normal heart sounds, S1 normal and S2 normal. No murmur heard.  Pulmonary:      Effort: Pulmonary effort is normal.      Breath sounds: Normal breath sounds.   Abdominal:      General: Bowel sounds are normal. There is no distension or abdominal bruit.      Palpations: Abdomen is soft. There is no mass.      Tenderness: There is no abdominal tenderness. There is no right CVA tenderness, left CVA tenderness, guarding or rebound.      Hernia: No hernia is present.   Genitourinary:     Comments: deferred  Musculoskeletal:         General: Deformity present. Normal range of motion.      Cervical back: Normal range of motion and neck supple.   Lymphadenopathy:      Cervical: No cervical adenopathy.   Skin:     General: Skin is warm and dry.      Capillary Refill: Capillary refill takes less than 2 seconds.      Findings: Lesion present. No rash.      Nails: There is no clubbing.   Neurological:      Mental " Status: She is alert and oriented to person, place, and time.      Cranial Nerves: No cranial nerve deficit.      Sensory: No sensory deficit.      Gait: Gait normal.   Psychiatric:         Behavior: Behavior normal.         Thought Content: Thought content normal.         Judgment: Judgment normal.              Assessment & Plan   1. Healthy female exam.    2. Patient Counseling: Including but not Limited to the following, when appropriate:  --Nutrition: Stressed importance of moderation in sodium/caffeine intake, saturated fat and cholesterol, caloric balance, sufficient intake of fresh fruits, vegetables, fiber, calcium, iron, and 1 mg of folate supplement per day (for females capable of pregnancy).  --Discussed the issue of estrogen replacement, calcium supplement, and the daily use of baby aspirin.  --Exercise: Stressed the importance of regular exercise.   --Substance Abuse: Discussed cessation/primary prevention of tobacco, alcohol, or other drug use; driving or other dangerous activities under the influence; availability of treatment for abuse, as indicated based on social history.    --Sexuality: Discussed sexually transmitted diseases, partner selection, use of condoms, avoidance of unintended pregnancy  and contraceptive alternatives.   --Injury prevention: Discussed safety belts, safety helmets, smoke detector, smoking near bedding or upholstery.   --Dental health: Discussed importance of regular tooth brushing, flossing, and dental visits.  --Immunizations reviewed.  --Discussed benefits of colon cancer screening.      3. Discussed the patient's BMI with her.  The BMI is above average; BMI management plan is completed  4. Return if symptoms worsen or fail to improve.  5. Age-appropriate Screening Scheduled      Assessment & Plan   Assessment & Plan  1. Annual physical examination:  - Consistently elevated clinic blood pressure despite normal home readings  - Advised to bring blood pressure cuff for  calibration or check at hospital  - Ordered mammogram, schedule via [facility]  - Encouraged regular self-breast exams  - Maintain good posture  - Ensure smoke alarms function    2. Hyperlipidemia:  - Significant family history  - CT calcium score normal in 2021  - Advised to increase dietary fiber to lower cholesterol  - Recheck cholesterol in 3 months    3. Hypothyroidism:  - Feels better with TSH around 2  - TSH test planned, adjust medication if levels high  - Repeat TSH test with cholesterol recheck    4. Skin lesion:  - Long-standing skin condition treated by dermatologist  - New dry spot, possibly from dermaplaning, not itchy  - Small scab removed months ago  - Schedule dermatologist appointment for evaluation  - Avoid picking at lesion    Diagnoses and all orders for this visit:    1. Physical exam, annual (Primary)  -     Comprehensive Metabolic Panel  -     Lipid Panel  -     CBC Auto Differential    2. Screening for endocrine, nutritional, metabolic and immunity disorder  -     Hemoglobin A1c  -     Vitamin B12  -     TSH  -     T4, Free    3. Vitamin D insufficiency    4. Hypothyroidism, unspecified type    5. Mixed hyperlipidemia  -     Lipoprotein A (LPA)    6. Encounter for screening mammogram for malignant neoplasm of breast  -     Mammo Screening Digital Tomosynthesis Bilateral With CAD             Patient or patient representative verbalized consent for the use of Ambient Listening during the visit with  TIAGO Aguirre for chart documentation.       TIAGO Aguirre 03/24/2025

## 2025-04-10 ENCOUNTER — LAB (OUTPATIENT)
Dept: LAB | Facility: HOSPITAL | Age: 55
End: 2025-04-10
Payer: COMMERCIAL

## 2025-04-10 LAB
ALBUMIN SERPL-MCNC: 4.3 G/DL (ref 3.5–5.2)
ALBUMIN/GLOB SERPL: 1.5 G/DL
ALP SERPL-CCNC: 89 U/L (ref 39–117)
ALT SERPL W P-5'-P-CCNC: 20 U/L (ref 1–33)
ANION GAP SERPL CALCULATED.3IONS-SCNC: 10.3 MMOL/L (ref 5–15)
AST SERPL-CCNC: 25 U/L (ref 1–32)
BASOPHILS # BLD AUTO: 0.03 10*3/MM3 (ref 0–0.2)
BASOPHILS NFR BLD AUTO: 0.7 % (ref 0–1.5)
BILIRUB SERPL-MCNC: 0.4 MG/DL (ref 0–1.2)
BUN SERPL-MCNC: 16 MG/DL (ref 6–20)
BUN/CREAT SERPL: 17 (ref 7–25)
CALCIUM SPEC-SCNC: 9.2 MG/DL (ref 8.6–10.5)
CHLORIDE SERPL-SCNC: 103 MMOL/L (ref 98–107)
CHOLEST SERPL-MCNC: 259 MG/DL (ref 0–200)
CO2 SERPL-SCNC: 24.7 MMOL/L (ref 22–29)
CREAT SERPL-MCNC: 0.94 MG/DL (ref 0.57–1)
DEPRECATED RDW RBC AUTO: 42 FL (ref 37–54)
EGFRCR SERPLBLD CKD-EPI 2021: 72.3 ML/MIN/1.73
EOSINOPHIL # BLD AUTO: 0.12 10*3/MM3 (ref 0–0.4)
EOSINOPHIL NFR BLD AUTO: 2.8 % (ref 0.3–6.2)
ERYTHROCYTE [DISTWIDTH] IN BLOOD BY AUTOMATED COUNT: 12.8 % (ref 12.3–15.4)
GLOBULIN UR ELPH-MCNC: 2.9 GM/DL
GLUCOSE SERPL-MCNC: 90 MG/DL (ref 65–99)
HBA1C MFR BLD: 5.6 % (ref 4.8–5.6)
HCT VFR BLD AUTO: 43.1 % (ref 34–46.6)
HDLC SERPL-MCNC: 56 MG/DL (ref 40–60)
HGB BLD-MCNC: 13.9 G/DL (ref 12–15.9)
IMM GRANULOCYTES # BLD AUTO: 0.01 10*3/MM3 (ref 0–0.05)
IMM GRANULOCYTES NFR BLD AUTO: 0.2 % (ref 0–0.5)
LDLC SERPL CALC-MCNC: 183 MG/DL (ref 0–100)
LDLC/HDLC SERPL: 3.21 {RATIO}
LYMPHOCYTES # BLD AUTO: 1.38 10*3/MM3 (ref 0.7–3.1)
LYMPHOCYTES NFR BLD AUTO: 32.5 % (ref 19.6–45.3)
MCH RBC QN AUTO: 29.1 PG (ref 26.6–33)
MCHC RBC AUTO-ENTMCNC: 32.3 G/DL (ref 31.5–35.7)
MCV RBC AUTO: 90.4 FL (ref 79–97)
MONOCYTES # BLD AUTO: 0.39 10*3/MM3 (ref 0.1–0.9)
MONOCYTES NFR BLD AUTO: 9.2 % (ref 5–12)
NEUTROPHILS NFR BLD AUTO: 2.31 10*3/MM3 (ref 1.7–7)
NEUTROPHILS NFR BLD AUTO: 54.6 % (ref 42.7–76)
NRBC BLD AUTO-RTO: 0 /100 WBC (ref 0–0.2)
PLATELET # BLD AUTO: 227 10*3/MM3 (ref 140–450)
PMV BLD AUTO: 12.1 FL (ref 6–12)
POTASSIUM SERPL-SCNC: 4.5 MMOL/L (ref 3.5–5.2)
PROT SERPL-MCNC: 7.2 G/DL (ref 6–8.5)
RBC # BLD AUTO: 4.77 10*6/MM3 (ref 3.77–5.28)
SODIUM SERPL-SCNC: 138 MMOL/L (ref 136–145)
T4 FREE SERPL-MCNC: 1.26 NG/DL (ref 0.92–1.68)
TRIGL SERPL-MCNC: 115 MG/DL (ref 0–150)
TSH SERPL DL<=0.05 MIU/L-ACNC: 2.05 UIU/ML (ref 0.27–4.2)
VIT B12 BLD-MCNC: 894 PG/ML (ref 211–946)
VLDLC SERPL-MCNC: 20 MG/DL (ref 5–40)
WBC NRBC COR # BLD AUTO: 4.24 10*3/MM3 (ref 3.4–10.8)

## 2025-04-10 PROCEDURE — 80050 GENERAL HEALTH PANEL: CPT | Performed by: NURSE PRACTITIONER

## 2025-04-10 PROCEDURE — 82607 VITAMIN B-12: CPT | Performed by: NURSE PRACTITIONER

## 2025-04-10 PROCEDURE — 83036 HEMOGLOBIN GLYCOSYLATED A1C: CPT | Performed by: NURSE PRACTITIONER

## 2025-04-10 PROCEDURE — 80061 LIPID PANEL: CPT | Performed by: NURSE PRACTITIONER

## 2025-04-10 PROCEDURE — 83695 ASSAY OF LIPOPROTEIN(A): CPT | Performed by: NURSE PRACTITIONER

## 2025-04-10 PROCEDURE — 84439 ASSAY OF FREE THYROXINE: CPT | Performed by: NURSE PRACTITIONER

## 2025-04-14 LAB — LPA SERPL-SCNC: 23.6 NMOL/L

## 2025-06-04 ENCOUNTER — HOSPITAL ENCOUNTER (OUTPATIENT)
Dept: MAMMOGRAPHY | Facility: HOSPITAL | Age: 55
Discharge: HOME | End: 2025-06-04
Admitting: NURSE PRACTITIONER
Payer: COMMERCIAL

## 2025-06-04 PROCEDURE — 77067 SCR MAMMO BI INCL CAD: CPT

## 2025-06-04 PROCEDURE — 77063 BREAST TOMOSYNTHESIS BI: CPT

## 2025-08-22 DIAGNOSIS — R79.89 ELEVATED TSH: ICD-10-CM

## 2025-08-22 DIAGNOSIS — R63.5 UNEXPLAINED WEIGHT GAIN: ICD-10-CM

## 2025-08-22 RX ORDER — LEVOTHYROXINE SODIUM 50 MCG
50 TABLET ORAL DAILY
Qty: 90 TABLET | Refills: 3 | Status: SHIPPED | OUTPATIENT
Start: 2025-08-22